# Patient Record
Sex: MALE | Race: WHITE | NOT HISPANIC OR LATINO | Employment: PART TIME | URBAN - METROPOLITAN AREA
[De-identification: names, ages, dates, MRNs, and addresses within clinical notes are randomized per-mention and may not be internally consistent; named-entity substitution may affect disease eponyms.]

---

## 2022-01-01 ENCOUNTER — HOSPITAL ENCOUNTER (INPATIENT)
Facility: CLINIC | Age: 37
LOS: 3 days | Discharge: HOME | End: 2022-01-05
Attending: EMERGENCY MEDICINE | Admitting: INTERNAL MEDICINE
Payer: COMMERCIAL

## 2022-01-01 ENCOUNTER — APPOINTMENT (OUTPATIENT)
Dept: GENERAL RADIOLOGY | Facility: CLINIC | Age: 37
End: 2022-01-01
Attending: EMERGENCY MEDICINE
Payer: COMMERCIAL

## 2022-01-01 DIAGNOSIS — R94.31 QT PROLONGATION: ICD-10-CM

## 2022-01-01 DIAGNOSIS — J96.01 ACUTE RESPIRATORY FAILURE WITH HYPOXIA (H): ICD-10-CM

## 2022-01-01 DIAGNOSIS — U07.1 PNEUMONIA DUE TO 2019 NOVEL CORONAVIRUS: ICD-10-CM

## 2022-01-01 DIAGNOSIS — J12.82 PNEUMONIA DUE TO 2019 NOVEL CORONAVIRUS: ICD-10-CM

## 2022-01-01 LAB
ALBUMIN SERPL-MCNC: 2 G/DL (ref 3.4–5)
ALP SERPL-CCNC: 124 U/L (ref 40–150)
ALT SERPL W P-5'-P-CCNC: 49 U/L (ref 0–70)
ANION GAP SERPL CALCULATED.3IONS-SCNC: 9 MMOL/L (ref 3–14)
AST SERPL W P-5'-P-CCNC: 63 U/L (ref 0–45)
BASOPHILS # BLD AUTO: 0.1 10E3/UL (ref 0–0.2)
BASOPHILS NFR BLD AUTO: 1 %
BILIRUB SERPL-MCNC: 1.1 MG/DL (ref 0.2–1.3)
BUN SERPL-MCNC: 7 MG/DL (ref 7–30)
CALCIUM SERPL-MCNC: 8.7 MG/DL (ref 8.5–10.1)
CHLORIDE BLD-SCNC: 101 MMOL/L (ref 94–109)
CO2 SERPL-SCNC: 25 MMOL/L (ref 20–32)
CREAT SERPL-MCNC: 0.66 MG/DL (ref 0.66–1.25)
D DIMER PPP FEU-MCNC: >20 UG/ML FEU (ref 0–0.5)
EOSINOPHIL # BLD AUTO: 0.1 10E3/UL (ref 0–0.7)
EOSINOPHIL NFR BLD AUTO: 1 %
ERYTHROCYTE [DISTWIDTH] IN BLOOD BY AUTOMATED COUNT: 12.3 % (ref 10–15)
GFR SERPL CREATININE-BSD FRML MDRD: >90 ML/MIN/1.73M2
GLUCOSE BLD-MCNC: 132 MG/DL (ref 70–99)
HCO3 BLDV-SCNC: 27 MMOL/L (ref 21–28)
HCT VFR BLD AUTO: 51.2 % (ref 40–53)
HGB BLD-MCNC: 17.6 G/DL (ref 13.3–17.7)
HOLD SPECIMEN: NORMAL
HOLD SPECIMEN: NORMAL
IMM GRANULOCYTES # BLD: 0.2 10E3/UL
IMM GRANULOCYTES NFR BLD: 2 %
LACTATE BLD-SCNC: 3.2 MMOL/L
LYMPHOCYTES # BLD AUTO: 1.7 10E3/UL (ref 0.8–5.3)
LYMPHOCYTES NFR BLD AUTO: 11 %
MAGNESIUM SERPL-MCNC: 2.2 MG/DL (ref 1.6–2.3)
MCH RBC QN AUTO: 31.6 PG (ref 26.5–33)
MCHC RBC AUTO-ENTMCNC: 34.4 G/DL (ref 31.5–36.5)
MCV RBC AUTO: 92 FL (ref 78–100)
MONOCYTES # BLD AUTO: 1.7 10E3/UL (ref 0–1.3)
MONOCYTES NFR BLD AUTO: 11 %
NEUTROPHILS # BLD AUTO: 12.1 10E3/UL (ref 1.6–8.3)
NEUTROPHILS NFR BLD AUTO: 74 %
NRBC # BLD AUTO: 0 10E3/UL
NRBC BLD AUTO-RTO: 0 /100
PCO2 BLDV: 46 MM HG (ref 40–50)
PH BLDV: 7.38 [PH] (ref 7.32–7.43)
PLATELET # BLD AUTO: 335 10E3/UL (ref 150–450)
PO2 BLDV: 23 MM HG (ref 25–47)
POTASSIUM BLD-SCNC: 3.5 MMOL/L (ref 3.4–5.3)
PROT SERPL-MCNC: 7.8 G/DL (ref 6.8–8.8)
RBC # BLD AUTO: 5.57 10E6/UL (ref 4.4–5.9)
SAO2 % BLDV: 37 % (ref 94–100)
SODIUM SERPL-SCNC: 135 MMOL/L (ref 133–144)
TROPONIN I SERPL HS-MCNC: 8 NG/L
WBC # BLD AUTO: 15.9 10E3/UL (ref 4–11)

## 2022-01-01 PROCEDURE — 71045 X-RAY EXAM CHEST 1 VIEW: CPT

## 2022-01-01 PROCEDURE — 250N000011 HC RX IP 250 OP 636: Performed by: EMERGENCY MEDICINE

## 2022-01-01 PROCEDURE — 999N000185 HC STATISTIC TRANSPORT TIME EA 15 MIN

## 2022-01-01 PROCEDURE — 96366 THER/PROPH/DIAG IV INF ADDON: CPT

## 2022-01-01 PROCEDURE — 93005 ELECTROCARDIOGRAM TRACING: CPT

## 2022-01-01 PROCEDURE — 85025 COMPLETE CBC W/AUTO DIFF WBC: CPT | Performed by: EMERGENCY MEDICINE

## 2022-01-01 PROCEDURE — 96368 THER/DIAG CONCURRENT INF: CPT

## 2022-01-01 PROCEDURE — 87040 BLOOD CULTURE FOR BACTERIA: CPT | Performed by: EMERGENCY MEDICINE

## 2022-01-01 PROCEDURE — 96367 TX/PROPH/DG ADDL SEQ IV INF: CPT

## 2022-01-01 PROCEDURE — 80053 COMPREHEN METABOLIC PANEL: CPT | Performed by: EMERGENCY MEDICINE

## 2022-01-01 PROCEDURE — 258N000003 HC RX IP 258 OP 636: Performed by: EMERGENCY MEDICINE

## 2022-01-01 PROCEDURE — 99292 CRITICAL CARE ADDL 30 MIN: CPT

## 2022-01-01 PROCEDURE — 36415 COLL VENOUS BLD VENIPUNCTURE: CPT | Performed by: EMERGENCY MEDICINE

## 2022-01-01 PROCEDURE — 85379 FIBRIN DEGRADATION QUANT: CPT | Performed by: EMERGENCY MEDICINE

## 2022-01-01 PROCEDURE — 82803 BLOOD GASES ANY COMBINATION: CPT

## 2022-01-01 PROCEDURE — 83735 ASSAY OF MAGNESIUM: CPT | Performed by: EMERGENCY MEDICINE

## 2022-01-01 PROCEDURE — 82040 ASSAY OF SERUM ALBUMIN: CPT | Performed by: EMERGENCY MEDICINE

## 2022-01-01 PROCEDURE — 94660 CPAP INITIATION&MGMT: CPT

## 2022-01-01 PROCEDURE — 84484 ASSAY OF TROPONIN QUANT: CPT | Performed by: EMERGENCY MEDICINE

## 2022-01-01 PROCEDURE — 96365 THER/PROPH/DIAG IV INF INIT: CPT

## 2022-01-01 PROCEDURE — 96375 TX/PRO/DX INJ NEW DRUG ADDON: CPT

## 2022-01-01 PROCEDURE — 96361 HYDRATE IV INFUSION ADD-ON: CPT

## 2022-01-01 PROCEDURE — 99291 CRITICAL CARE FIRST HOUR: CPT | Mod: 25

## 2022-01-01 PROCEDURE — 84145 PROCALCITONIN (PCT): CPT | Performed by: INTERNAL MEDICINE

## 2022-01-01 RX ORDER — SODIUM CHLORIDE 9 MG/ML
INJECTION, SOLUTION INTRAVENOUS CONTINUOUS
Status: DISCONTINUED | OUTPATIENT
Start: 2022-01-02 | End: 2022-01-02

## 2022-01-01 RX ORDER — MAGNESIUM SULFATE HEPTAHYDRATE 40 MG/ML
2 INJECTION, SOLUTION INTRAVENOUS ONCE
Status: COMPLETED | OUTPATIENT
Start: 2022-01-01 | End: 2022-01-02

## 2022-01-01 RX ORDER — CEFTRIAXONE 2 G/1
2 INJECTION, POWDER, FOR SOLUTION INTRAMUSCULAR; INTRAVENOUS ONCE
Status: COMPLETED | OUTPATIENT
Start: 2022-01-01 | End: 2022-01-02

## 2022-01-01 RX ORDER — LORAZEPAM 2 MG/ML
0.5 INJECTION INTRAMUSCULAR ONCE
Status: COMPLETED | OUTPATIENT
Start: 2022-01-01 | End: 2022-01-01

## 2022-01-01 RX ORDER — DOXYCYCLINE 100 MG/10ML
100 INJECTION, POWDER, LYOPHILIZED, FOR SOLUTION INTRAVENOUS EVERY 12 HOURS
Status: DISCONTINUED | OUTPATIENT
Start: 2022-01-01 | End: 2022-01-05

## 2022-01-01 RX ORDER — DEXAMETHASONE SODIUM PHOSPHATE 10 MG/ML
6 INJECTION, SOLUTION INTRAMUSCULAR; INTRAVENOUS ONCE
Status: COMPLETED | OUTPATIENT
Start: 2022-01-01 | End: 2022-01-01

## 2022-01-01 RX ADMIN — SODIUM CHLORIDE 1000 ML: 9 INJECTION, SOLUTION INTRAVENOUS at 23:10

## 2022-01-01 RX ADMIN — CEFTRIAXONE 2 G: 2 INJECTION, POWDER, FOR SOLUTION INTRAMUSCULAR; INTRAVENOUS at 23:39

## 2022-01-01 RX ADMIN — LORAZEPAM 0.5 MG: 2 INJECTION INTRAMUSCULAR; INTRAVENOUS at 23:25

## 2022-01-01 RX ADMIN — DEXAMETHASONE SODIUM PHOSPHATE 6 MG: 10 INJECTION INTRAMUSCULAR; INTRAVENOUS at 23:10

## 2022-01-02 ENCOUNTER — APPOINTMENT (OUTPATIENT)
Dept: CT IMAGING | Facility: CLINIC | Age: 37
End: 2022-01-02
Attending: INTERNAL MEDICINE
Payer: COMMERCIAL

## 2022-01-02 ENCOUNTER — APPOINTMENT (OUTPATIENT)
Dept: ULTRASOUND IMAGING | Facility: CLINIC | Age: 37
End: 2022-01-02
Attending: EMERGENCY MEDICINE
Payer: COMMERCIAL

## 2022-01-02 ENCOUNTER — APPOINTMENT (OUTPATIENT)
Dept: CARDIOLOGY | Facility: CLINIC | Age: 37
End: 2022-01-02
Attending: INTERNAL MEDICINE
Payer: COMMERCIAL

## 2022-01-02 PROBLEM — U07.1 PNEUMONIA DUE TO 2019 NOVEL CORONAVIRUS: Status: ACTIVE | Noted: 2022-01-02

## 2022-01-02 PROBLEM — J12.82 PNEUMONIA DUE TO 2019 NOVEL CORONAVIRUS: Status: ACTIVE | Noted: 2022-01-02

## 2022-01-02 LAB
ANION GAP SERPL CALCULATED.3IONS-SCNC: 9 MMOL/L (ref 3–14)
BUN SERPL-MCNC: 7 MG/DL (ref 7–30)
CALCIUM SERPL-MCNC: 8.3 MG/DL (ref 8.5–10.1)
CHLORIDE BLD-SCNC: 107 MMOL/L (ref 94–109)
CO2 SERPL-SCNC: 25 MMOL/L (ref 20–32)
CREAT SERPL-MCNC: 0.63 MG/DL (ref 0.66–1.25)
CRP SERPL-MCNC: 290 MG/L (ref 0–8)
D DIMER PPP FEU-MCNC: >20 UG/ML FEU (ref 0–0.5)
GFR SERPL CREATININE-BSD FRML MDRD: >90 ML/MIN/1.73M2
GLUCOSE BLD-MCNC: 110 MG/DL (ref 70–99)
HOLD SPECIMEN: NORMAL
HOLD SPECIMEN: NORMAL
LACTATE SERPL-SCNC: 1 MMOL/L (ref 0.7–2)
LVEF ECHO: NORMAL
POTASSIUM BLD-SCNC: 3.8 MMOL/L (ref 3.4–5.3)
PROCALCITONIN SERPL-MCNC: 19.83 NG/ML
SODIUM SERPL-SCNC: 141 MMOL/L (ref 133–144)

## 2022-01-02 PROCEDURE — 999N000158 HC STATISTIC RCP TIME ED VENT EA 10 MIN

## 2022-01-02 PROCEDURE — 250N000009 HC RX 250: Performed by: INTERNAL MEDICINE

## 2022-01-02 PROCEDURE — 250N000011 HC RX IP 250 OP 636: Performed by: EMERGENCY MEDICINE

## 2022-01-02 PROCEDURE — 999N000157 HC STATISTIC RCP TIME EA 10 MIN

## 2022-01-02 PROCEDURE — 86140 C-REACTIVE PROTEIN: CPT | Performed by: INTERNAL MEDICINE

## 2022-01-02 PROCEDURE — 250N000013 HC RX MED GY IP 250 OP 250 PS 637: Performed by: INTERNAL MEDICINE

## 2022-01-02 PROCEDURE — 250N000011 HC RX IP 250 OP 636: Performed by: INTERNAL MEDICINE

## 2022-01-02 PROCEDURE — 36415 COLL VENOUS BLD VENIPUNCTURE: CPT | Performed by: EMERGENCY MEDICINE

## 2022-01-02 PROCEDURE — 93970 EXTREMITY STUDY: CPT

## 2022-01-02 PROCEDURE — 83605 ASSAY OF LACTIC ACID: CPT | Performed by: EMERGENCY MEDICINE

## 2022-01-02 PROCEDURE — 99291 CRITICAL CARE FIRST HOUR: CPT | Performed by: INTERNAL MEDICINE

## 2022-01-02 PROCEDURE — 999N000105 HC STATISTIC NO DOCUMENTATION TO SUPPORT CHARGE

## 2022-01-02 PROCEDURE — 999N000185 HC STATISTIC TRANSPORT TIME EA 15 MIN

## 2022-01-02 PROCEDURE — 258N000003 HC RX IP 258 OP 636: Performed by: INTERNAL MEDICINE

## 2022-01-02 PROCEDURE — 93306 TTE W/DOPPLER COMPLETE: CPT | Mod: 26 | Performed by: INTERNAL MEDICINE

## 2022-01-02 PROCEDURE — 71275 CT ANGIOGRAPHY CHEST: CPT

## 2022-01-02 PROCEDURE — 85379 FIBRIN DEGRADATION QUANT: CPT | Performed by: INTERNAL MEDICINE

## 2022-01-02 PROCEDURE — 250N000009 HC RX 250: Performed by: EMERGENCY MEDICINE

## 2022-01-02 PROCEDURE — XW033E5 INTRODUCTION OF REMDESIVIR ANTI-INFECTIVE INTO PERIPHERAL VEIN, PERCUTANEOUS APPROACH, NEW TECHNOLOGY GROUP 5: ICD-10-PCS | Performed by: INTERNAL MEDICINE

## 2022-01-02 PROCEDURE — 200N000001 HC R&B ICU

## 2022-01-02 PROCEDURE — 94660 CPAP INITIATION&MGMT: CPT

## 2022-01-02 PROCEDURE — 82310 ASSAY OF CALCIUM: CPT | Performed by: INTERNAL MEDICINE

## 2022-01-02 PROCEDURE — 93306 TTE W/DOPPLER COMPLETE: CPT

## 2022-01-02 PROCEDURE — 36415 COLL VENOUS BLD VENIPUNCTURE: CPT | Performed by: INTERNAL MEDICINE

## 2022-01-02 RX ORDER — BISACODYL 10 MG
10 SUPPOSITORY, RECTAL RECTAL DAILY PRN
Status: DISCONTINUED | OUTPATIENT
Start: 2022-01-02 | End: 2022-01-05 | Stop reason: HOSPADM

## 2022-01-02 RX ORDER — ALLOPURINOL 100 MG/1
100 TABLET ORAL DAILY
COMMUNITY
Start: 2021-11-23

## 2022-01-02 RX ORDER — SODIUM CHLORIDE AND POTASSIUM CHLORIDE 150; 900 MG/100ML; MG/100ML
INJECTION, SOLUTION INTRAVENOUS CONTINUOUS
Status: DISCONTINUED | OUTPATIENT
Start: 2022-01-02 | End: 2022-01-03

## 2022-01-02 RX ORDER — DEXTROAMPHETAMINE SACCHARATE, AMPHETAMINE ASPARTATE MONOHYDRATE, DEXTROAMPHETAMINE SULFATE AND AMPHETAMINE SULFATE 7.5; 7.5; 7.5; 7.5 MG/1; MG/1; MG/1; MG/1
30 CAPSULE, EXTENDED RELEASE ORAL DAILY
COMMUNITY
Start: 2021-06-30

## 2022-01-02 RX ORDER — DEXTROSE MONOHYDRATE 25 G/50ML
25-50 INJECTION, SOLUTION INTRAVENOUS
Status: DISCONTINUED | OUTPATIENT
Start: 2022-01-02 | End: 2022-01-05 | Stop reason: HOSPADM

## 2022-01-02 RX ORDER — ONDANSETRON 4 MG/1
4 TABLET, ORALLY DISINTEGRATING ORAL EVERY 6 HOURS PRN
Status: DISCONTINUED | OUTPATIENT
Start: 2022-01-02 | End: 2022-01-05 | Stop reason: HOSPADM

## 2022-01-02 RX ORDER — HYDROMORPHONE HYDROCHLORIDE 1 MG/ML
.3-.5 INJECTION, SOLUTION INTRAMUSCULAR; INTRAVENOUS; SUBCUTANEOUS
Status: DISCONTINUED | OUTPATIENT
Start: 2022-01-02 | End: 2022-01-05 | Stop reason: HOSPADM

## 2022-01-02 RX ORDER — SODIUM CHLORIDE 9 MG/ML
INJECTION, SOLUTION INTRAVENOUS CONTINUOUS
Status: DISCONTINUED | OUTPATIENT
Start: 2022-01-02 | End: 2022-01-02 | Stop reason: ALTCHOICE

## 2022-01-02 RX ORDER — CEFTRIAXONE 2 G/1
2 INJECTION, POWDER, FOR SOLUTION INTRAMUSCULAR; INTRAVENOUS EVERY 24 HOURS
Status: DISCONTINUED | OUTPATIENT
Start: 2022-01-02 | End: 2022-01-05 | Stop reason: HOSPADM

## 2022-01-02 RX ORDER — ACETAMINOPHEN 650 MG/1
650 SUPPOSITORY RECTAL EVERY 4 HOURS PRN
Status: DISCONTINUED | OUTPATIENT
Start: 2022-01-02 | End: 2022-01-05 | Stop reason: HOSPADM

## 2022-01-02 RX ORDER — DEXAMETHASONE SODIUM PHOSPHATE 10 MG/ML
6 INJECTION, SOLUTION INTRAMUSCULAR; INTRAVENOUS EVERY 24 HOURS
Status: DISCONTINUED | OUTPATIENT
Start: 2022-01-02 | End: 2022-01-04

## 2022-01-02 RX ORDER — IOPAMIDOL 755 MG/ML
500 INJECTION, SOLUTION INTRAVASCULAR ONCE
Status: COMPLETED | OUTPATIENT
Start: 2022-01-02 | End: 2022-01-02

## 2022-01-02 RX ORDER — NICOTINE 21 MG/24HR
1 PATCH, TRANSDERMAL 24 HOURS TRANSDERMAL EVERY 24 HOURS
Status: ON HOLD | COMMUNITY
End: 2022-01-05

## 2022-01-02 RX ORDER — ONDANSETRON 2 MG/ML
4 INJECTION INTRAMUSCULAR; INTRAVENOUS EVERY 6 HOURS PRN
Status: DISCONTINUED | OUTPATIENT
Start: 2022-01-02 | End: 2022-01-05 | Stop reason: HOSPADM

## 2022-01-02 RX ORDER — NICOTINE 21 MG/24HR
1 PATCH, TRANSDERMAL 24 HOURS TRANSDERMAL DAILY
Status: DISCONTINUED | OUTPATIENT
Start: 2022-01-02 | End: 2022-01-05 | Stop reason: HOSPADM

## 2022-01-02 RX ORDER — ACETAMINOPHEN 325 MG/1
650 TABLET ORAL EVERY 4 HOURS PRN
Status: DISCONTINUED | OUTPATIENT
Start: 2022-01-02 | End: 2022-01-05 | Stop reason: HOSPADM

## 2022-01-02 RX ORDER — NICOTINE POLACRILEX 4 MG
15-30 LOZENGE BUCCAL
Status: DISCONTINUED | OUTPATIENT
Start: 2022-01-02 | End: 2022-01-05 | Stop reason: HOSPADM

## 2022-01-02 RX ADMIN — REMDESIVIR 200 MG: 100 INJECTION, POWDER, LYOPHILIZED, FOR SOLUTION INTRAVENOUS at 01:59

## 2022-01-02 RX ADMIN — ENOXAPARIN SODIUM 100 MG: 100 INJECTION SUBCUTANEOUS at 12:16

## 2022-01-02 RX ADMIN — POTASSIUM CHLORIDE AND SODIUM CHLORIDE: 900; 150 INJECTION, SOLUTION INTRAVENOUS at 12:15

## 2022-01-02 RX ADMIN — DOXYCYCLINE 100 MG: 100 INJECTION, POWDER, LYOPHILIZED, FOR SOLUTION INTRAVENOUS at 22:20

## 2022-01-02 RX ADMIN — NICOTINE 1 PATCH: 21 PATCH, EXTENDED RELEASE TRANSDERMAL at 15:32

## 2022-01-02 RX ADMIN — SODIUM CHLORIDE 90 ML: 9 INJECTION, SOLUTION INTRAVENOUS at 16:57

## 2022-01-02 RX ADMIN — ENOXAPARIN SODIUM 100 MG: 100 INJECTION SUBCUTANEOUS at 00:58

## 2022-01-02 RX ADMIN — DOXYCYCLINE 100 MG: 100 INJECTION, POWDER, LYOPHILIZED, FOR SOLUTION INTRAVENOUS at 11:12

## 2022-01-02 RX ADMIN — DEXAMETHASONE SODIUM PHOSPHATE 6 MG: 10 INJECTION INTRAMUSCULAR; INTRAVENOUS at 12:14

## 2022-01-02 RX ADMIN — SODIUM CHLORIDE 50 ML: 900 INJECTION INTRAVENOUS at 02:38

## 2022-01-02 RX ADMIN — SODIUM CHLORIDE: 9 INJECTION, SOLUTION INTRAVENOUS at 03:31

## 2022-01-02 RX ADMIN — CEFTRIAXONE 2 G: 2 INJECTION, POWDER, FOR SOLUTION INTRAMUSCULAR; INTRAVENOUS at 21:49

## 2022-01-02 RX ADMIN — IOPAMIDOL 71 ML: 755 INJECTION, SOLUTION INTRAVENOUS at 16:57

## 2022-01-02 RX ADMIN — SODIUM CHLORIDE 50 ML: 9 INJECTION, SOLUTION INTRAVENOUS at 17:23

## 2022-01-02 RX ADMIN — MAGNESIUM SULFATE HEPTAHYDRATE 2 G: 40 INJECTION, SOLUTION INTRAVENOUS at 00:11

## 2022-01-02 RX ADMIN — REMDESIVIR 100 MG: 100 INJECTION, POWDER, LYOPHILIZED, FOR SOLUTION INTRAVENOUS at 17:23

## 2022-01-02 RX ADMIN — DOXYCYCLINE 100 MG: 100 INJECTION, POWDER, LYOPHILIZED, FOR SOLUTION INTRAVENOUS at 00:08

## 2022-01-02 RX ADMIN — POTASSIUM CHLORIDE AND SODIUM CHLORIDE: 900; 150 INJECTION, SOLUTION INTRAVENOUS at 21:50

## 2022-01-02 ASSESSMENT — ACTIVITIES OF DAILY LIVING (ADL)
ADLS_ACUITY_SCORE: 12
ADLS_ACUITY_SCORE: 3

## 2022-01-02 NOTE — H&P
[facility]  Hospitalist Admission Note  Name: Greg Sage    MRN: [id number]  YOB: 1985    Age: 36 year old  Date of admission: 1/1/2022  Primary care provider: Candy Julian    Chief Complaint: COVID-19 pneumonia, possible bacterial superinfection    Greg Sage is a 36 year old male who is not vaccinated against Covid but denies any known medical problems who presents with severe shortness of breath, tachycardia and left-sided chest pain in the setting of approximately 9 days of symptoms of COVID-19.  He reports that he tested +9 days ago and continued to have cough, body aches and shortness of breath.  Today however, he developed fairly severe pain right below his left collarbone and also some left upper thigh discomfort.      In the ER triage his oxygen saturation was approximately 52%.  He was tachycardic to the 140s with a rhythm appearing to be sinus.  Blood pressure was stable to mildly hypertensive and he had increased rate of breathing up to 40/min.  Lab work-up showed relatively normal CMP aside from albumin of just 2.0 and AST mildly elevated at 63.  Lactic acid was elevated at 3.2, high-sensitivity troponin was negative at 8.  He had neutrophilic leukocytosis with white blood count of 15.9.  Venous blood gas showed pH of 7.38 with bicarbonate of 27, PO2 of 23 and PCO2 of 46.  COVID-19 swab was positive D-dimer was profoundly elevated at greater than 20.  X-ray showed patchy bilateral opacities compatible with COVID-19 pneumonia.    Blood cultures were obtained and he was started on empiric IV ceftriaxone, doxycycline and given a dose of Decadron.  Attempt was made at a CT with PE protocol but due to his shortness of breath he could not lie in the CT scanner so that attempt was aborted for now.  Lower extremity ultrasounds were ordered and in the meantime due to very high clinical suspicion for DVT/PE with profoundly elevated D-dimer he was given a dose of  therapeutic Lovenox.    He is currently saturating approximately 94% on 100% FiO2 BiPAP with pressures of 14/7.  He will be admitted under ICU status for ongoing care.      Assessment and Plan:   1. Severe hypoxemic respiratory failure due to COVID-19 pneumonia, rule out PE, rule out bacterial superinfection: As above.  --Admit under inpatient status in the ICU  --Continue Decadron 6 mg daily  --Add remdesivir, discussed this with him and he is agreeable  --Continue ceftriaxone and doxycycline.  Add on procalcitonin and await blood cultures  --He was given a 1 L fluid bolus in the ER followed by 125 cc/h normal saline for possible bacterial sepsis.  --Repeat lactic acid  --Obtain lower extremity ultrasound.  Plan to continue therapeutic range anticoagulation for now  --Obtain CT PE protocol when and if we are able  --Consider baricitinib though I think it is possible he has a bacterial infection which would be a contraindication to that or tocilizumab  --Trend inflammatory markers, D-dimer, renal function, CBC etc.  --It is possible he will require intubation tonight.  He is full code and I discussed this with him.    2.   Profoundly elevated D-dimer: Could not tolerate CT PE protocol due to respiratory distress.  Given 100 mg subcu Lovenox to start therapeutic anticoagulation in the meantime.  Obtain lower extremity ultrasound and CT PE protocol as able.  Certainly he is quite hypercoagulable from COVID-19 and has left upper chest pain which could be from a PE or pleuristy from pneumonia.  --Continue Lovenox 100 mg twice daily for now, revisit once further imaging data are available  --Check echocardiogram to evaluate for evidence of right heart strain or other cause of sinus tachycardia  --obtain CT PE protocol as able.    3.   Sinus tachycardia, elevated lactic acid and leukocytosis: Qualifies as septic shock.  Raises the question of a bacterial superinfection as well, specifically bacterial pneumonia.  Given 1L  bolus, now on 125 mL/hr NS.  --Being somewhat judicious with fluids in the setting of COVID-19 and the general preference to be cautious to avoid fluid overload with COVID-19 infection  --Continue normal saline at 75 cc/h for the next 12 hours, revisit fluids in the morning  --Recheck lactic acid  --Continue ceftriaxone and doxycycline  --Await blood cultures  --Echo  --Check CT PE protocol to evaluate for pulmonary embolism.  Troponin is negative which is somewhat reassuring against submassive PE.          DVT Prophylaxis: Therapeutic dose Lovenox pending ultrasound and potentially CT PE protocol.  Will need to revisit anticoagulation dosing once the studies are completed.  Code Status: Full Code, I did discuss this with him.  Discharge Dispo: Admit under ICU status      History of Present Illness:  Greg Sage is a 36 year old male who is not vaccinated against Covid but denies any known medical problems who presents with severe shortness of breath, tachycardia and left-sided chest pain in the setting of approximately 9 days of symptoms of COVID-19.  He reports that he tested +9 days ago and continued to have cough, body aches and shortness of breath.  Today however, he developed fairly severe pain right below his left collarbone and also some left upper thigh discomfort.      In the ER triage his oxygen saturation was approximately 52% requiring urgent stabilization and placement of BiPAP.    History is somewhat limited by the acuity of his situation and the fact that he is currently on 100% FiO2 BiPAP.  He is able to offer brief answers to questions.  He confirms he is not vaccinated and does confirm his full code.  Denies any medical history really whatsoever     Past Medical History:  Denies any chronic medical problems    Past Surgical History:  Denies any surgeries    Social History:  Difficult to obtain due to the acuity of the situation.      Family History:  Reports both of his parents had cancer at  one point.  Father is .    Allergies:  Allergies   Allergen Reactions     Bactrim [Sulfamethoxazole W/Trimethoprim]      Medications:  None.      Review of Systems:  A Comprehensive greater than 10 system review of systems was carried out.  Pertinent positives and negatives are noted above.  Otherwise negative for contributory information.     Physical Exam:  Blood pressure 130/80, pulse (!) 130, temperature 99.1  F (37.3  C), temperature source Temporal, resp. rate (!) 40, weight 99.8 kg (220 lb), SpO2 92 %.  Wt Readings from Last 1 Encounters:   22 99.8 kg (220 lb)     Exam:  General: Ill-appearing well-built male lying in bed with a BiPAP mask in place  HEENT: BiPAP mask in place, external ocular movements intact.  Face appears symmetric.  Cardiac: Tachycardic, regular  Pulmonary: Increased rate of breathing, slightly increased work of breathing  Abdomen: soft, non-tender, non-distended.  Bowel Sounds Present.  No guarding.  Extremities: no deformities.  Warm, well perfused.  Skin: no rashes or lesions noted.  Warm and Dry.  Neuro: No focal deficits noted.  Speech clear.  Coordination and strength grossly normal.  Psych: Appropriate affect.    Data:  EKG: Sinus tachycardia with nonspecific ST changes  Imaging:  Results for orders placed or performed during the hospital encounter of 22   XR Chest Port 1 View    Narrative    EXAM: XR CHEST PORT 1 VIEW  LOCATION: [facility]  DATE/TIME: 2022 11:07 PM    INDICATION: shortness of breath  COMPARISON: None.      Impression    IMPRESSION: Extensive patchy airspace infiltrates both lower lobes most consistent with pneumonia and very consistent with COVID. Heart size normal.     Labs:  Recent Labs   Lab 22  2300   WBC 15.9*   HGB 17.6   HCT 51.2   MCV 92             Lab Results   Component Value Date     2022    Lab Results   Component Value Date    CHLORIDE 101 2022    Lab Results   Component  Value Date    BUN 7 01/01/2022      Lab Results   Component Value Date    POTASSIUM 3.5 01/01/2022    Lab Results   Component Value Date    CO2 25 01/01/2022    Lab Results   Component Value Date    CR 0.66 01/01/2022        Recent Labs   Lab 01/01/22  2300   DD >20.00*     No results for input(s): SED, CRP in the last 168 hours.      Patrick Sheikh MD  Hospitalist  [facility]    Evaluation and management time exclusive of procedures was 50 minutes critical care time including: urgent examination and evaluation of the patient, discussion of the patient's condition with other physicians and members of the care team, reviewing data and chart related to the patient, discussion of patient's condition with the family and time utilizing the EMR for documentation of this patient's care.

## 2022-01-02 NOTE — ED TRIAGE NOTES
Here for left chest pain and sob started today associated with left back pain, coughing, and headache. Stated diagnose with Covid last Thursday. ABCs intact.

## 2022-01-02 NOTE — PHARMACY-ADMISSION MEDICATION HISTORY
Admission medication history interview status for this patient is complete. See [facility] admission navigator for allergy information, prior to admission medications and immunization status.     Medication history interview done, indicate source(s): Patient  Medication history resources (including written lists, pill bottles, clinic record):SureScripts and Care Everywhere  Pharmacy: [facility] on [address]    Changes made to PTA medication list:  Added: nicotine  Changed: None  Reported as Not Taking: None  Removed: None    Actions taken by pharmacist (provider contacted, etc): Spoke with pt to verify med list.     Additional medication history information:None    Medication reconciliation/reorder completed by provider prior to medication history?  N   (Y/N)     For patients on insulin therapy: N     Prior to Admission medications    Medication Sig Last Dose Taking? Auth Provider   allopurinol (ZYLOPRIM) 100 MG tablet Take 100 mg by mouth daily 1/1/2022 at Unknown time Yes Reported, Patient   amphetamine-dextroamphetamine (ADDERALL XR) 30 MG 24 hr capsule Take 30 mg by mouth 1/1/2022 at Unknown time Yes Reported, Patient   nicotine (NICODERM CQ) 14 MG/24HR 24 hr patch Place 1 patch onto the skin every 24 hours Past Week at Unknown time Yes Unknown, Entered By History

## 2022-01-02 NOTE — PROGRESS NOTES
A BiPAP of  12/6 @ 40% was applied to the pt via the mask for an increase in WOB and/or SOB.  .Pt is tolerating it well. Will continue to monitor and assess the pt's current respiratory status and needs.

## 2022-01-02 NOTE — PROGRESS NOTES
No charge note,  patient admitted earlier this morning by Dr. Sheikh please refer to H&P agree with his assessment and plan    36 year old male who is not vaccinated against Covid but denies any known medical problems who presents with severe shortness of breath, tachycardia and left-sided chest pain in the setting of approximately 9 days of symptoms of COVID-19.  He reports that he tested +9 days ago and continued to have cough, body aches and shortness of breath.    Severe hypoxemic respiratory failure due to COVID-19 pneumonia, rule out PE, rule out bacterial superinfection: baricitinib was not used because concern for superimposed pneumonia procalcitonin almost 20  Currently on BiPAP for acute hypoxic respiratory failure  Continue remdesivir and Decadron  Continue antibiotic coverage for superimposed pneumonia highly suspected    Significantly elevated D-dimer with clinical concern for pulmonary embolism so far unable to tolerate CT scan will try again later today continue full dose Lovenox for now.  Echocardiogram is pending to evaluate for RV strain    Full code  Expected stay multiple days more than 5    I tried to update his wife no answer in the voice message did not

## 2022-01-02 NOTE — ED PROVIDER NOTES
History   Chief Complaint:  Shortness of Breath       HPI   Greg Sage is a 36 year old male who presents with shortness of breath. The patient reports that he woke up today with chest pain, shortness of breath, and coughing. He tested positive for COVID 9 days ago. The patient is not vaccinated against COVID. His [facility] site for medical care is [facility].    Review of Systems   All other systems reviewed and are negative.    Allergies:  Bactrim [Sulfamethoxazole W/Trimethoprim]    Medications:  The patient is not currently taking any prescribed medications.    Past Medical History:     The patient denies any significant past medical history.    Social History:  Patient presents alone    Physical Exam     Patient Vitals for the past 24 hrs:   BP Temp Temp src Pulse Resp SpO2 Weight   01/02/22 0000 130/80 -- -- (!) 130 (!) 40 92 % --   01/01/22 2359 -- -- -- -- -- -- 99.8 kg (220 lb)   01/01/22 2300 (!) 130/92 -- -- (!) 145 -- 90 % 99.8 kg (220 lb)   01/01/22 2248 (!) 150/98 99.1  F (37.3  C) Temporal (!) 167 18 (!) 52 % --       Physical Exam  General: Resting on the bed.  Appears very short of breath and ill  Head: No obvious trauma to head.  Ears, Nose, Throat:  External ears normal.  Nose normal.    Eyes:  Conjunctivae clear.  Pupils are equal, round, and reactive.   Neck: Normal range of motion.  Neck supple.   CV: Regular rate and rhythm.  No murmurs.      Respiratory: Effort increased, moving minimal air. No focal wheezing or crackles.  Retracting.    Gastrointestinal: Soft.  No distension. There is no tenderness.    Musculoskeletal: Non tender non edematous calves  Neuro: Alert. Moving all extremities appropriately.  Normal speech.    Skin: Skin is warm and dry.  No rash noted.   Emergency Department Course   ECG  ECG obtained at 2305, ECG read at 2312  Sinus tachycardia with occasional premature ventricular complexes  ST & T wave abnormality- rate related changes, consider inferior  ischemia  Prolonged QTc  Abnormal ECG   Rate 141 bpm. CA interval 112 ms. QRS duration 84 ms. QT/QTc 358/548 ms. P-R-T axes 47 51 17.     Imaging:  US Lower Extremity Venous Duplex Bilateral   Final Result   IMPRESSION:   1.  No deep venous thrombosis in the bilateral lower extremities.      XR Chest Port 1 View   Final Result   IMPRESSION: Extensive patchy airspace infiltrates both lower lobes most consistent with pneumonia and very consistent with COVID. Heart size normal.      CT Chest Pulmonary Embolism w Contrast    (Results Pending)     Report per radiology    Laboratory:  Labs Ordered and Resulted from Time of ED Arrival to Time of ED Departure   COMPREHENSIVE METABOLIC PANEL - Abnormal       Result Value    Sodium 135      Potassium 3.5      Chloride 101      Carbon Dioxide (CO2) 25      Anion Gap 9      Urea Nitrogen 7      Creatinine 0.66      Calcium 8.7      Glucose 132 (*)     Alkaline Phosphatase 124      AST 63 (*)     ALT 49      Protein Total 7.8      Albumin 2.0 (*)     Bilirubin Total 1.1      GFR Estimate >90     D DIMER QUANTITATIVE - Abnormal    D-Dimer Quantitative >20.00 (*)    CBC WITH PLATELETS AND DIFFERENTIAL - Abnormal    WBC Count 15.9 (*)     RBC Count 5.57      Hemoglobin 17.6      Hematocrit 51.2      MCV 92      MCH 31.6      MCHC 34.4      RDW 12.3      Platelet Count 335      % Neutrophils 74      % Lymphocytes 11      % Monocytes 11      % Eosinophils 1      % Basophils 1      % Immature Granulocytes 2      NRBCs per 100 WBC 0      Absolute Neutrophils 12.1 (*)     Absolute Lymphocytes 1.7      Absolute Monocytes 1.7 (*)     Absolute Eosinophils 0.1      Absolute Basophils 0.1      Absolute Immature Granulocytes 0.2      Absolute NRBCs 0.0     ISTAT GASES LACTATE VENOUS POCT - Abnormal    Lactic Acid POCT 3.2 (*)     Bicarbonate Venous POCT 27      O2 Sat, Venous POCT 37 (*)     pCO2V Venous POCT 46      pH Venous POCT 7.38      pO2 Venous POCT 23 (*)    TROPONIN I - Normal     Troponin I High Sensitivity 8     MAGNESIUM - Normal    Magnesium 2.2     LACTIC ACID WHOLE BLOOD   BLOOD CULTURE   BLOOD CULTURE        Emergency Department Course:       Reviewed:  I reviewed nursing notes, vitals and past medical history    Assessments:   I obtained history and examined the patient as noted above.    I rechecked the patient and explained findings.     Consults:  13 I spoke with Dr. Sheikh, hospitalist, regarding the patient    Interventions:  2310 NS 1 L IV  2310 Decadron 6 mg IV  2325 Ativan 0.5 mg IV  2339 Rocephin 2 g IV  0008 Doxycycline 100 mg IV  0011 Magnesium sulfate 2 g IV    Disposition:  The patient was admitted to the hospital under the care of Dr. Sheikh.     Impression & Plan       Medical Decision Makin-year-old male presents with shortness of breath.  Patient reports COVID-19 diagnosis 9 days previously.  Patient is very hypoxic with pulse ox in the 50s in triage.  He is significantly tachycardic as well.  Broad differential was pursued including not limited to pneumonia, pneumothorax, effusion, ACS or arrhythmia, PE, electrolyte, metabolic, renal dysfunction, dehydration, etc.  CBC with increased white count 15.9 but no anemia.  Lactate elevated 3.2.  Concern for severe sepsis therefore ceftriaxone and doxycycline were administered.  Patient has recent COVID-19 infection, this is the likely etiology of patient's pneumonia but plan to treat regardless.  Blood cultures pending.  BMP without acute electrolyte, metabolic or renal dysfunction.  D-dimer is greater than 20 although patient is unable to tolerate laying flat.  Bilateral lower extremity ultrasound were done and negative for DVT.  Lovenox given for empiric PE coverage.  EKG shows sinus tachycardia, rate related changes.  Troponin is negative.  QTc does appear somewhat prolonged therefore avoiding further prolonging agents.  Magnesium level normal.  Given prolonged QTC I did give 2 g of mag  regardless.  Remainder of electrolytes were normal.  Chest x-ray looks concerning for COVID-19 pneumonia.  No pneumothorax or effusion.  Given patient's significant work of breathing, hypoxia he was immediately transitioned to BiPAP.  BiPAP did improve patient's work of breathing.  He required a small amount of Ativan to relax enough to tolerate the mask.  We gave albuterol as well as patient was not moving large amount of air.  He was given dexamethasone as well for COVID-19 pneumonia.  At this point patient remains critically ill requiring ICU admission.  Discussed with the hospitalist who graciously accepted.  He continues to tolerate BiPAP well and we are continuing to slowly down titrate the FiO2 requirements.  Regardless patient remains quite ill and will require admission.    Critical Care Time: was 45 minutes for this patient excluding procedures    Diagnosis:    ICD-10-CM    1. Pneumonia due to 2019 novel coronavirus  U07.1     J12.82        Scribe Disclosure:  I, Yasmin Teran, am serving as a scribe at 10:57 PM on 1/1/2022 to document services personally performed by Ronit Orozco MD based on my observations and the provider's statements to me.            Ronit Orozco MD  01/02/22 0208

## 2022-01-02 NOTE — ED NOTES
[facility]  ED Nurse Handoff Report    Greg Sage is a 36 year old male   ED Chief complaint: Shortness of Breath  . ED Diagnosis:   Final diagnoses:   Pneumonia due to 2019 novel coronavirus   QT prolongation   Acute respiratory failure with hypoxia (H)     Allergies:   Allergies   Allergen Reactions     Bactrim [Sulfamethoxazole W/Trimethoprim]        Code Status: Full Code  Activity level - Baseline/Home:  Independent. Activity Level - Current:   Assist X 1. Lift room needed: No. Bariatric: No   Needed: No   Isolation: Yes. Infection: Not Applicable  COVID r/o and special precautions.     Vital Signs:   Vitals:    01/02/22 0730 01/02/22 0800 01/02/22 0824 01/02/22 0830   BP: (!) 133/93 127/87  122/86   Pulse: 109 97 105 100   Resp:   26    Temp:   99.3  F (37.4  C)    TempSrc:   Axillary    SpO2: 98% 97% 98% 98%   Weight:           Cardiac Rhythm:  ,      Pain level:    Patient confused: No. Patient Falls Risk: Yes.   Elimination Status: Has voided   Patient Report - Initial Complaint: SOB. Focused Assessment: Greg Sage is a 36 year old male who presents with shortness of breath. The patient reports that he woke up today with chest pain, shortness of breath, and coughing. He tested positive for COVID 9 days ago. The patient is not vaccinated against COVID. His usual site for medical care is [facility].   Tests Performed: see list; CT not performed as pt is unable to tolerate lying flat. Abnormal Results:   US Lower Extremity Venous Duplex Bilateral   Final Result   IMPRESSION:   1.  No deep venous thrombosis in the bilateral lower extremities.      XR Chest Port 1 View   Final Result   IMPRESSION: Extensive patchy airspace infiltrates both lower lobes most consistent with pneumonia and very consistent with COVID. Heart size normal.      Echocardiogram Complete    (Results Pending)     Labs Ordered and Resulted from Time of ED Arrival to Time of ED Departure    COMPREHENSIVE METABOLIC PANEL - Abnormal       Result Value    Sodium 135      Potassium 3.5      Chloride 101      Carbon Dioxide (CO2) 25      Anion Gap 9      Urea Nitrogen 7      Creatinine 0.66      Calcium 8.7      Glucose 132 (*)     Alkaline Phosphatase 124      AST 63 (*)     ALT 49      Protein Total 7.8      Albumin 2.0 (*)     Bilirubin Total 1.1      GFR Estimate >90     D DIMER QUANTITATIVE - Abnormal    D-Dimer Quantitative >20.00 (*)    CBC WITH PLATELETS AND DIFFERENTIAL - Abnormal    WBC Count 15.9 (*)     RBC Count 5.57      Hemoglobin 17.6      Hematocrit 51.2      MCV 92      MCH 31.6      MCHC 34.4      RDW 12.3      Platelet Count 335      % Neutrophils 74      % Lymphocytes 11      % Monocytes 11      % Eosinophils 1      % Basophils 1      % Immature Granulocytes 2      NRBCs per 100 WBC 0      Absolute Neutrophils 12.1 (*)     Absolute Lymphocytes 1.7      Absolute Monocytes 1.7 (*)     Absolute Eosinophils 0.1      Absolute Basophils 0.1      Absolute Immature Granulocytes 0.2      Absolute NRBCs 0.0     ISTAT GASES LACTATE VENOUS POCT - Abnormal    Lactic Acid POCT 3.2 (*)     Bicarbonate Venous POCT 27      O2 Sat, Venous POCT 37 (*)     pCO2V Venous POCT 46      pH Venous POCT 7.38      pO2 Venous POCT 23 (*)    PROCALCITONIN - Abnormal    Procalcitonin 19.83 (*)    CRP INFLAMMATION - Abnormal    CRP Inflammation 290.0 (*)    TROPONIN I - Normal    Troponin I High Sensitivity 8     MAGNESIUM - Normal    Magnesium 2.2     LACTIC ACID WHOLE BLOOD - Normal    Lactic Acid 1.0     GLUCOSE MONITOR NURSING POCT   D DIMER QUANTITATIVE   BLOOD CULTURE   BLOOD CULTURE   .   Treatments provided: see eMAR  Family Comments: NA  OBS brochure/video discussed/provided to patient:  N/A  ED Medications:   Medications   0.9% sodium chloride BOLUS (1,000 mLs Intravenous Not Given 1/1/22 2309)   doxycycline (VIBRAMYCIN) 100 mg vial to attach to  mL bag (0 mg Intravenous Stopped 1/2/22 0109)    enoxaparin ANTICOAGULANT (LOVENOX) injection 100 mg (has no administration in time range)   cefTRIAXone (ROCEPHIN) 2 g vial to attach to  ml bag for ADULTS or NS 50 ml bag for PEDS (has no administration in time range)   dexamethasone PF (DECADRON) injection 6 mg (has no administration in time range)   glucose gel 15-30 g (has no administration in time range)     Or   dextrose 50 % injection 25-50 mL (has no administration in time range)     Or   glucagon injection 1 mg (has no administration in time range)   Patient is already receiving anticoagulation with heparin, enoxaparin (LOVENOX), warfarin (COUMADIN)  or other anticoagulant medication (has no administration in time range)   acetaminophen (TYLENOL) Suppository 650 mg (has no administration in time range)   acetaminophen (TYLENOL) tablet 650 mg (has no administration in time range)     Or   acetaminophen (TYLENOL) solution 650 mg (has no administration in time range)   HYDROmorphone (PF) (DILAUDID) injection 0.3-0.5 mg (has no administration in time range)   ondansetron (ZOFRAN-ODT) ODT tab 4 mg (has no administration in time range)     Or   ondansetron (ZOFRAN) injection 4 mg (has no administration in time range)   bisacodyl (DULCOLAX) Suppository 10 mg (has no administration in time range)   remdesivir 100 mg in sodium chloride 0.9 % 250 mL intermittent infusion (has no administration in time range)     And   0.9% sodium chloride BOLUS (has no administration in time range)   sodium chloride 0.9% infusion ( Intravenous Rate/Dose Verify 1/2/22 0832)   dexamethasone PF (DECADRON) injection 6 mg (6 mg Intravenous Given 1/1/22 2310)   0.9% sodium chloride BOLUS (0 mLs Intravenous Stopped 1/2/22 0027)   cefTRIAXone (ROCEPHIN) 2 g vial to attach to  ml bag for ADULTS or NS 50 ml bag for PEDS (0 g Intravenous Stopped 1/2/22 0000)   magnesium sulfate 2 g in water intermittent infusion (0 g Intravenous Stopped 1/2/22 0130)   LORazepam (ATIVAN) injection  0.5 mg (0.5 mg Intravenous Given 1/1/22 8767)   enoxaparin ANTICOAGULANT (LOVENOX) injection 100 mg (100 mg Subcutaneous Given 1/2/22 8008)   remdesivir 200 mg in sodium chloride 0.9 % 250 mL intermittent infusion (0 mg Intravenous Stopped 1/2/22 0238)     Followed by   0.9% sodium chloride BOLUS (0 mLs Intravenous Stopped 1/2/22 0326)     Drips infusing:  Yes  For the majority of the shift, the patient's behavior Green. Interventions performed were NA.    Sepsis treatment initiated: No     Patient tested for COVID 19 prior to admission: YES    ED Nurse Name/Phone Number: Filomena Cespedes RN,   8:44 AM

## 2022-01-02 NOTE — ED NOTES
DATE:  1/1/2022   TIME OF RECEIPT FROM LAB:  4587  LAB TEST:  D-Dimer  LAB VALUE:  >20  RESULTS GIVEN WITH READ-BACK TO (PROVIDER):  Ronit Orozco MD  TIME LAB VALUE REPORTED TO PROVIDER:   7763

## 2022-01-02 NOTE — ED NOTES
Pt's wife requested update, update provided. Advised that pt will be staying at our ICU and will eventually be moved up there once a room is available, also explained that pt has a CT ordered. Pt's wife, duncan, verbalized understanding. Pt's wife requesting update regarding results when they are available.

## 2022-01-02 NOTE — PROGRESS NOTES
A BiPAP of  14/7 @ 100% was applied to the pt via the mask for an increase in WOB and/or SOB.  The bridge of the nose good , Pt is tolerating it well. Will continue to monitor and assess the pt's current respiratory status and needs.    Transport to CT . Nebs x 2 given inline.

## 2022-01-03 LAB
ALBUMIN SERPL-MCNC: 1.6 G/DL (ref 3.4–5)
ALP SERPL-CCNC: 85 U/L (ref 40–150)
ALT SERPL W P-5'-P-CCNC: 29 U/L (ref 0–70)
ANION GAP SERPL CALCULATED.3IONS-SCNC: 6 MMOL/L (ref 3–14)
AST SERPL W P-5'-P-CCNC: 26 U/L (ref 0–45)
ATRIAL RATE - MUSE: 141 BPM
BILIRUB SERPL-MCNC: 0.5 MG/DL (ref 0.2–1.3)
BUN SERPL-MCNC: 12 MG/DL (ref 7–30)
CALCIUM SERPL-MCNC: 7.7 MG/DL (ref 8.5–10.1)
CHLORIDE BLD-SCNC: 114 MMOL/L (ref 94–109)
CO2 SERPL-SCNC: 23 MMOL/L (ref 20–32)
CREAT SERPL-MCNC: 0.55 MG/DL (ref 0.66–1.25)
CRP SERPL-MCNC: 263 MG/L (ref 0–8)
D DIMER PPP FEU-MCNC: 8.13 UG/ML FEU (ref 0–0.5)
DIASTOLIC BLOOD PRESSURE - MUSE: NORMAL MMHG
ERYTHROCYTE [DISTWIDTH] IN BLOOD BY AUTOMATED COUNT: 12.8 % (ref 10–15)
GFR SERPL CREATININE-BSD FRML MDRD: >90 ML/MIN/1.73M2
GLUCOSE BLD-MCNC: 111 MG/DL (ref 70–99)
GLUCOSE BLDC GLUCOMTR-MCNC: 106 MG/DL (ref 70–99)
GLUCOSE BLDC GLUCOMTR-MCNC: 107 MG/DL (ref 70–99)
GLUCOSE BLDC GLUCOMTR-MCNC: 110 MG/DL (ref 70–99)
GLUCOSE BLDC GLUCOMTR-MCNC: 69 MG/DL (ref 70–99)
GLUCOSE BLDC GLUCOMTR-MCNC: 83 MG/DL (ref 70–99)
GLUCOSE BLDC GLUCOMTR-MCNC: 99 MG/DL (ref 70–99)
HCT VFR BLD AUTO: 44.6 % (ref 40–53)
HGB BLD-MCNC: 15.1 G/DL (ref 13.3–17.7)
INTERPRETATION ECG - MUSE: NORMAL
LACTATE SERPL-SCNC: 1.3 MMOL/L (ref 0.7–2)
MAGNESIUM SERPL-MCNC: 2.8 MG/DL (ref 1.6–2.3)
MCH RBC QN AUTO: 32 PG (ref 26.5–33)
MCHC RBC AUTO-ENTMCNC: 33.9 G/DL (ref 31.5–36.5)
MCV RBC AUTO: 95 FL (ref 78–100)
P AXIS - MUSE: 47 DEGREES
PHOSPHATE SERPL-MCNC: 1.8 MG/DL (ref 2.5–4.5)
PLATELET # BLD AUTO: 308 10E3/UL (ref 150–450)
POTASSIUM BLD-SCNC: 3.7 MMOL/L (ref 3.4–5.3)
PR INTERVAL - MUSE: 112 MS
PROT SERPL-MCNC: 6.6 G/DL (ref 6.8–8.8)
QRS DURATION - MUSE: 84 MS
QT - MUSE: 358 MS
QTC - MUSE: 548 MS
R AXIS - MUSE: 51 DEGREES
RBC # BLD AUTO: 4.72 10E6/UL (ref 4.4–5.9)
SODIUM SERPL-SCNC: 143 MMOL/L (ref 133–144)
SYSTOLIC BLOOD PRESSURE - MUSE: NORMAL MMHG
T AXIS - MUSE: 17 DEGREES
VENTRICULAR RATE- MUSE: 141 BPM
WBC # BLD AUTO: 15.1 10E3/UL (ref 4–11)

## 2022-01-03 PROCEDURE — 250N000013 HC RX MED GY IP 250 OP 250 PS 637: Performed by: INTERNAL MEDICINE

## 2022-01-03 PROCEDURE — 85379 FIBRIN DEGRADATION QUANT: CPT | Performed by: INTERNAL MEDICINE

## 2022-01-03 PROCEDURE — 120N000001 HC R&B MED SURG/OB

## 2022-01-03 PROCEDURE — 80053 COMPREHEN METABOLIC PANEL: CPT | Performed by: INTERNAL MEDICINE

## 2022-01-03 PROCEDURE — 258N000001 HC RX 258: Performed by: INTERNAL MEDICINE

## 2022-01-03 PROCEDURE — 120N000013 HC R&B IMCU

## 2022-01-03 PROCEDURE — 84100 ASSAY OF PHOSPHORUS: CPT | Performed by: INTERNAL MEDICINE

## 2022-01-03 PROCEDURE — 36415 COLL VENOUS BLD VENIPUNCTURE: CPT | Performed by: INTERNAL MEDICINE

## 2022-01-03 PROCEDURE — 250N000009 HC RX 250: Performed by: INTERNAL MEDICINE

## 2022-01-03 PROCEDURE — 85014 HEMATOCRIT: CPT | Performed by: INTERNAL MEDICINE

## 2022-01-03 PROCEDURE — 250N000013 HC RX MED GY IP 250 OP 250 PS 637: Performed by: SURGERY

## 2022-01-03 PROCEDURE — 250N000011 HC RX IP 250 OP 636: Performed by: INTERNAL MEDICINE

## 2022-01-03 PROCEDURE — 86140 C-REACTIVE PROTEIN: CPT | Performed by: INTERNAL MEDICINE

## 2022-01-03 PROCEDURE — 258N000003 HC RX IP 258 OP 636: Performed by: INTERNAL MEDICINE

## 2022-01-03 PROCEDURE — 83735 ASSAY OF MAGNESIUM: CPT | Performed by: INTERNAL MEDICINE

## 2022-01-03 PROCEDURE — 99233 SBSQ HOSP IP/OBS HIGH 50: CPT | Performed by: INTERNAL MEDICINE

## 2022-01-03 PROCEDURE — 83605 ASSAY OF LACTIC ACID: CPT | Performed by: INTERNAL MEDICINE

## 2022-01-03 PROCEDURE — 999N000157 HC STATISTIC RCP TIME EA 10 MIN

## 2022-01-03 PROCEDURE — 94660 CPAP INITIATION&MGMT: CPT

## 2022-01-03 RX ORDER — GABAPENTIN 300 MG/1
600 CAPSULE ORAL EVERY 8 HOURS
Status: COMPLETED | OUTPATIENT
Start: 2022-01-03 | End: 2022-01-05

## 2022-01-03 RX ORDER — GABAPENTIN 300 MG/1
300 CAPSULE ORAL EVERY 8 HOURS
Status: DISCONTINUED | OUTPATIENT
Start: 2022-01-06 | End: 2022-01-05 | Stop reason: HOSPADM

## 2022-01-03 RX ORDER — LIDOCAINE 40 MG/G
CREAM TOPICAL
Status: DISCONTINUED | OUTPATIENT
Start: 2022-01-03 | End: 2022-01-05 | Stop reason: HOSPADM

## 2022-01-03 RX ORDER — FOLIC ACID 1 MG/1
1 TABLET ORAL DAILY
Status: DISCONTINUED | OUTPATIENT
Start: 2022-01-03 | End: 2022-01-05 | Stop reason: HOSPADM

## 2022-01-03 RX ORDER — CLONIDINE HYDROCHLORIDE 0.1 MG/1
0.1 TABLET ORAL EVERY 8 HOURS
Status: DISCONTINUED | OUTPATIENT
Start: 2022-01-03 | End: 2022-01-05 | Stop reason: HOSPADM

## 2022-01-03 RX ORDER — DEXTROSE MONOHYDRATE 25 G/50ML
25-50 INJECTION, SOLUTION INTRAVENOUS
Status: DISCONTINUED | OUTPATIENT
Start: 2022-01-03 | End: 2022-01-03

## 2022-01-03 RX ORDER — LORAZEPAM 1 MG/1
1-2 TABLET ORAL EVERY 30 MIN PRN
Status: DISCONTINUED | OUTPATIENT
Start: 2022-01-03 | End: 2022-01-05 | Stop reason: HOSPADM

## 2022-01-03 RX ORDER — NITROGLYCERIN 0.4 MG/1
0.4 TABLET SUBLINGUAL EVERY 5 MIN PRN
Status: DISCONTINUED | OUTPATIENT
Start: 2022-01-03 | End: 2022-01-05 | Stop reason: HOSPADM

## 2022-01-03 RX ORDER — ALPRAZOLAM 1 MG
1 TABLET ORAL ONCE
Status: COMPLETED | OUTPATIENT
Start: 2022-01-03 | End: 2022-01-03

## 2022-01-03 RX ORDER — HALOPERIDOL 5 MG/ML
2.5-5 INJECTION INTRAMUSCULAR EVERY 6 HOURS PRN
Status: DISCONTINUED | OUTPATIENT
Start: 2022-01-03 | End: 2022-01-05 | Stop reason: HOSPADM

## 2022-01-03 RX ORDER — HYDROXYZINE HYDROCHLORIDE 25 MG/1
25 TABLET, FILM COATED ORAL EVERY 6 HOURS PRN
Status: DISCONTINUED | OUTPATIENT
Start: 2022-01-03 | End: 2022-01-05 | Stop reason: HOSPADM

## 2022-01-03 RX ORDER — OLANZAPINE 5 MG/1
5-10 TABLET, ORALLY DISINTEGRATING ORAL EVERY 6 HOURS PRN
Status: DISCONTINUED | OUTPATIENT
Start: 2022-01-03 | End: 2022-01-05 | Stop reason: HOSPADM

## 2022-01-03 RX ORDER — ALLOPURINOL 100 MG/1
100 TABLET ORAL DAILY
Status: DISCONTINUED | OUTPATIENT
Start: 2022-01-03 | End: 2022-01-05 | Stop reason: HOSPADM

## 2022-01-03 RX ORDER — MULTIPLE VITAMINS W/ MINERALS TAB 9MG-400MCG
1 TAB ORAL DAILY
Status: DISCONTINUED | OUTPATIENT
Start: 2022-01-03 | End: 2022-01-05 | Stop reason: HOSPADM

## 2022-01-03 RX ORDER — LORAZEPAM 2 MG/ML
1-2 INJECTION INTRAMUSCULAR EVERY 30 MIN PRN
Status: DISCONTINUED | OUTPATIENT
Start: 2022-01-03 | End: 2022-01-05 | Stop reason: HOSPADM

## 2022-01-03 RX ORDER — NICOTINE POLACRILEX 4 MG
15-30 LOZENGE BUCCAL
Status: DISCONTINUED | OUTPATIENT
Start: 2022-01-03 | End: 2022-01-03

## 2022-01-03 RX ORDER — FLUMAZENIL 0.1 MG/ML
0.2 INJECTION, SOLUTION INTRAVENOUS
Status: DISCONTINUED | OUTPATIENT
Start: 2022-01-03 | End: 2022-01-05 | Stop reason: HOSPADM

## 2022-01-03 RX ADMIN — SODIUM CHLORIDE 50 ML: 9 INJECTION, SOLUTION INTRAVENOUS at 18:13

## 2022-01-03 RX ADMIN — CLONIDINE HYDROCHLORIDE 0.1 MG: 0.1 TABLET ORAL at 14:38

## 2022-01-03 RX ADMIN — THIAMINE HCL TAB 100 MG 100 MG: 100 TAB at 14:38

## 2022-01-03 RX ADMIN — DEXTROSE MONOHYDRATE 25 ML: 25 INJECTION, SOLUTION INTRAVENOUS at 04:32

## 2022-01-03 RX ADMIN — DEXAMETHASONE SODIUM PHOSPHATE 6 MG: 10 INJECTION INTRAMUSCULAR; INTRAVENOUS at 12:37

## 2022-01-03 RX ADMIN — DOXYCYCLINE 100 MG: 100 INJECTION, POWDER, LYOPHILIZED, FOR SOLUTION INTRAVENOUS at 10:26

## 2022-01-03 RX ADMIN — ACETAMINOPHEN 650 MG: 325 TABLET, FILM COATED ORAL at 16:11

## 2022-01-03 RX ADMIN — ENOXAPARIN SODIUM 100 MG: 100 INJECTION SUBCUTANEOUS at 00:32

## 2022-01-03 RX ADMIN — REMDESIVIR 100 MG: 100 INJECTION, POWDER, LYOPHILIZED, FOR SOLUTION INTRAVENOUS at 17:29

## 2022-01-03 RX ADMIN — CLONIDINE HYDROCHLORIDE 0.1 MG: 0.1 TABLET ORAL at 21:36

## 2022-01-03 RX ADMIN — GABAPENTIN 600 MG: 300 CAPSULE ORAL at 14:38

## 2022-01-03 RX ADMIN — POTASSIUM & SODIUM PHOSPHATES POWDER PACK 280-160-250 MG 2 PACKET: 280-160-250 PACK at 23:27

## 2022-01-03 RX ADMIN — ACETAMINOPHEN 650 MG: 325 TABLET, FILM COATED ORAL at 05:04

## 2022-01-03 RX ADMIN — HYDROMORPHONE HYDROCHLORIDE 0.5 MG: 1 INJECTION, SOLUTION INTRAMUSCULAR; INTRAVENOUS; SUBCUTANEOUS at 12:38

## 2022-01-03 RX ADMIN — HYDROMORPHONE HYDROCHLORIDE 0.3 MG: 1 INJECTION, SOLUTION INTRAMUSCULAR; INTRAVENOUS; SUBCUTANEOUS at 06:57

## 2022-01-03 RX ADMIN — MULTIPLE VITAMINS W/ MINERALS TAB 1 TABLET: TAB at 14:38

## 2022-01-03 RX ADMIN — NICOTINE 1 PATCH: 21 PATCH, EXTENDED RELEASE TRANSDERMAL at 08:03

## 2022-01-03 RX ADMIN — CEFTRIAXONE 2 G: 2 INJECTION, POWDER, FOR SOLUTION INTRAMUSCULAR; INTRAVENOUS at 21:32

## 2022-01-03 RX ADMIN — FOLIC ACID 1 MG: 1 TABLET ORAL at 14:38

## 2022-01-03 RX ADMIN — ACETAMINOPHEN 650 MG: 325 TABLET, FILM COATED ORAL at 23:27

## 2022-01-03 RX ADMIN — ENOXAPARIN SODIUM 40 MG: 40 INJECTION SUBCUTANEOUS at 12:37

## 2022-01-03 RX ADMIN — LORAZEPAM 1 MG: 1 TABLET ORAL at 16:12

## 2022-01-03 RX ADMIN — GABAPENTIN 600 MG: 300 CAPSULE ORAL at 21:36

## 2022-01-03 RX ADMIN — ALLOPURINOL 100 MG: 100 TABLET ORAL at 10:19

## 2022-01-03 RX ADMIN — ALPRAZOLAM 1 MG: 1 TABLET ORAL at 02:24

## 2022-01-03 RX ADMIN — POTASSIUM CHLORIDE AND SODIUM CHLORIDE: 900; 150 INJECTION, SOLUTION INTRAVENOUS at 08:05

## 2022-01-03 RX ADMIN — POTASSIUM & SODIUM PHOSPHATES POWDER PACK 280-160-250 MG 2 PACKET: 280-160-250 PACK at 18:41

## 2022-01-03 ASSESSMENT — ACTIVITIES OF DAILY LIVING (ADL)
ADLS_ACUITY_SCORE: 3

## 2022-01-03 NOTE — PLAN OF CARE
ICU End of Shift Summary.  For vital signs and complete assessments, please see documentation flowsheets.     Pertinent assessments: Patient tolerated being on high flow at 50 L of flow at 50% FiO2.  He has been very anxious all night and has not slept at all.  Xanax ordered per telehub and this did not seem to make much difference.  He did receive prn dilaudid and he stated it only helped a little but he did look visibly relatively more comfortable.  SpO2 drops to low 90s when he is talking.  His sats dropped when he walked a few steps from the ED cot to the icu bed.  Mouth swabs and mouth moisturizer given to patient- he has complained frequently of dry mouth.    Major Shift Events: Off from Bipap to high flow    Plan (Upcoming Events):   Discharge/Transfer Needs: Possibly go to floor today if continues to do well.    Bedside Shift Report Completed :   Bedside Safety Check Completed:

## 2022-01-03 NOTE — PROVIDER NOTIFICATION
Paged Dr. Palmer to discuss back pain unrelieved by Dilaudid and pt's reported use of ETOH (4 16oz drinks/night x6 days a week). Awaiting call back.

## 2022-01-03 NOTE — ED NOTES
Called pt's wife Whitley and gave an update regarding CT and plan of care. Pt's wife verbalized understanding, she would like a call once he is moved to the ICU. This will be passed on to next primary RN.

## 2022-01-03 NOTE — PROGRESS NOTES
Switched pt to HFNC 40 LPM @ 50% from BiPAP.  Will continue to wean pt as tolerated.    Keiko Antoine, RT

## 2022-01-03 NOTE — CONSULTS
Care Management Initial Consult    General Information  Assessment completed with: Spouse or significant other,    Type of CM/SW Visit: Initial Assessment    Primary Care Provider verified and updated as needed:     Readmission within the last 30 days:        Reason for Consult: discharge planning  Advance Care Planning:            Communication Assessment  Patient's communication style: spoken language (English or Bilingual)    Hearing Difficulty or Deaf: no   Wear Glasses or Blind: no    Cognitive  Cognitive/Neuro/Behavioral: .WDL except  Level of Consciousness: alert  Arousal Level: opens eyes spontaneously  Orientation: oriented x 4  Mood/Behavior: anxious  Best Language: 0 - No aphasia  Speech: logical,spontaneous,clear    Living Environment:   People in home: child(chapincito), dependent,spouse     Current living Arrangements: house      Able to return to prior arrangements: yes       Family/Social Support:  Care provided by: self,child(chapincito)  Provides care for:    Marital Status:   Wife          Description of Support System: Supportive,Involved         Current Resources:   Patient receiving home care services: No     Community Resources: None  Equipment currently used at home: none  Supplies currently used at home:      Employment/Financial:  Employment Status: employed part-time        Financial Concerns: No concerns identified           Lifestyle & Psychosocial Needs:  Social Determinants of Health     Tobacco Use: Not on file   Alcohol Use: Not on file   Financial Resource Strain: Not on file   Food Insecurity: Not on file   Transportation Needs: Not on file   Physical Activity: Not on file   Stress: Not on file   Social Connections: Not on file   Intimate Partner Violence: Not on file   Depression: Not on file   Housing Stability: Not on file       Functional Status:  Prior to admission patient needed assistance: no       Mental Health Status:  Mental Health Status: No Current Concerns       Chemical  Dependency Status:  Chemical Dependency Status: Current Concern             Values/Beliefs:  Spiritual, Cultural Beliefs, Yazidism Practices, Values that affect care:                 Additional Information:  Tried calling pt on his cell phone d/t covid status, however, no answer. I did speak with his wife and she reports pt has been in a lot of pain so she hasn't even talked to him.  She reports they have a 14 yo son, live in a house, pt works part time for a cleaning company, wife works full time.  No current services. Pt drives.     Wife reports pt has a couple of drinks each night after work but she does not feel his use is problematic.  Pt has never had tx. Chem Dep consult for R25, however, pt does not qualify for a R25 because he has insurance coverage through his wife insurance.     Wife will be able to  pt at time of discharge.     Plan: SW will continue to follow and assist with discharge needs.    Patti PURDY, [facility]  Inpatient Care Coordination   [facility]   [phone]

## 2022-01-03 NOTE — PROGRESS NOTES
[facility]  Respiratory Care Note      The HFNC continues to be applied to the  pt @ 45 LPM and 50% for PEEP therapy, with the BiPAP in standby. Skin looks good and remains intact. Will continue to monitor and assess the pt's current respiratory status and needs, in hopes of liberating him from the HFNC.     Vital signs:  Temp: 98.3  F (36.8  C) Temp src: Temporal BP: 134/88 Pulse: 98   Resp: 27 SpO2: 98 % O2 Device: High Flow Nasal Cannula (HFNC) Oxygen Delivery: 45 LPM   Weight: 92 kg (202 lb 13.2 oz)  There is no height or weight on file to calculate BMI.      No past medical history on file.    No past surgical history on file.    No family history on file.    Social History     Tobacco Use     Smoking status: Not on file     Smokeless tobacco: Not on file   Substance Use Topics     Alcohol use: Not on file     Chaparro HYLTON  [facility]  1/3/2022        .

## 2022-01-03 NOTE — PROGRESS NOTES
[facility]  Hospitalist Progress Note  Admit 1/1/2022 10:51 PM    Name: Greg Sage    MRN: [id number]  Provider:  Sreekanth Palmer MD, [facility]    Date of Service: 01/03/2022     Reason for Stay (Diagnosis): Acute respiratory failure secondary to COVID-19 pneumonia         Summary of hospital stay & Assessment/Plan:   Summary of Stay: Greg Sage is a 36 year old male who was admitted on 1/1/2022  36 year old male who is not vaccinated against Covid but denies any known medical problems who presents with severe shortness of breath, tachycardia and left-sided chest pain in the setting of approximately 9 days of symptoms of COVID-19.  He reports that he tested +9 days ago and continued to have cough, body aches and shortness of breath.    Problem List:   1. Severe hypoxemic respiratory failure due to COVID-19 pneumonia, rule out PE, rule out bacterial superinfection significantly elevated procalcitonin  2. History of vaping although denies during illness however wife reports heavy use cannot rule out vaping injury if clinically not improving as expected consider pulmonary consultation  3. Profound elevated D-dimer initial concern for PE, CT chest PE protocol was negative for pulmonary embolism likely from severe Covid pneumonia  4. Sepsis secondary to severe Covid pneumonia  5. Heavy alcohol use, started ETOH protocol, apparently has been treating low back pain with heavy alcohol use almost every night more than 16 ounces.  We will try gabapentin for both alcohol withdrawal, he is shaky and restless but also can help with his back pain.  We will also consult CD.    Transition to IMC from ICU  Allow p.o. intake if stable on high flow  Continue Decadron and remdesivir  Decrease Lovenox to 40 mg twice daily I would keep him on a higher dose prophylactically because of significant D-dimer elevation however he does not need full therapeutic dose was negative CT angiogram and no evidence of RV strain  on echocardiogram  Continue remdesivir  Continue pneumonia coverage with initial procalcitonin almost 20      DVT Prophylaxis: Enoxaparin (Lovenox) SQ  Code Status:  Full Code    Disposition Plan     Expected discharge in 3-4 days to prior living arrangement once oxygen requirement is 4 L/min or less for  24 hours.       Entered: Sreekanth Palmer 01/03/2022, 9:48 AM       Wife updated by phone            Interval History:       Reports improvement in breathing today, transition to high flow oxygen from BiPAP and seems to be stable doing well, most is dry he wants to drink  Today's plan detailed above discussed with nursing               Physical Exam:   Physical Exam   Temp: 99.2  F (37.3  C) Temp src: Axillary BP: (!) 139/97 Pulse: 104   Resp: 24 SpO2: 100 % O2 Device: High Flow Nasal Cannula (HFNC) Oxygen Delivery: 45 LPM  Vitals:    01/01/22 2300 01/01/22 2359 01/03/22 0000   Weight: 99.8 kg (220 lb) 99.8 kg (220 lb) 92 kg (202 lb 13.2 oz)     I/O last 3 completed shifts:  In: 1775 [I.V.:1775]  Out: 900 [Urine:900]      GENERAL:  Comfortable.   PSYCH: pleasant, oriented, No acute distress.  EYES: PERRLA, Normal conjunctiva.  HEART:  Normal S1, S2 with no edema.  LUNGS: Scattered rhonchi and rales decreased basilar sounds, normal Respiratory effort.  ABDOMEN:  Soft, no hepatosplenomegaly, normal bowel sounds.  SKIN:  Dry to touch, No rash.  Neuro: Non focal with normal motor power, sensation, CN's and Reflexes.    Medications     0.9% sodium chloride + KCl 20 mEq/L 100 mL/hr at 01/03/22 0805     - MEDICATION INSTRUCTIONS -         remdesivir  100 mg Intravenous Q24H    And     sodium chloride 0.9%  50 mL Intravenous Q24H     sodium chloride 0.9%  1,000 mL Intravenous Once     allopurinol  100 mg Oral Daily     cefTRIAXone  2 g Intravenous Q24H     dexamethasone  6 mg Intravenous Q24H     doxycycline (VIBRAMYCIN) IV  100 mg Intravenous Q12H     enoxaparin ANTICOAGULANT  1 mg/kg Subcutaneous Q12H     nicotine  1  patch Transdermal Daily     nicotine   Transdermal Q8H     sodium chloride (PF)  3 mL Intracatheter Q8H     Data     -Data reviewed today:  I personally reviewed  all new labs and imaging results over the last 24 hours.    Recent Labs   Lab 22  0522  2300   WBC 15.1* 15.9*   HGB 15.1 17.6   HCT 44.6 51.2   MCV 95 92    335     Recent Labs   Lab 22  0801 22  0530 22  0519 22  0031 22  0707 22  2300   NA  --  143  --   --  141 135   POTASSIUM  --  3.7  --   --  3.8 3.5   CHLORIDE  --  114*  --   --  107 101   CO2  --  23  --   --  25 25   ANIONGAP  --  6  --   --  9 9   GLC 99 111* 106*   < > 110* 132*   BUN  --  12  --   --  7 7   CR  --  0.55*  --   --  0.63* 0.66   GFRESTIMATED  --  >90  --   --  >90 >90   LUDIN  --  7.7*  --   --  8.3* 8.7    < > = values in this interval not displayed.       Recent Results (from the past 24 hour(s))   Echocardiogram Complete   Result Value    LVEF  60-65%    Narrative    [id number]  [id number]  [id number]  [id number]^BRANDON^BRIGIDO^ROYCE     [facility]  Echocardiography Laboratory  [address] [place] [address]     Name: JAEL LAWSON  MRN: [id number]  : 1985  Study Date: 2022 09:55 AM  Age: 36 yrs  Gender: Male  Patient Location: [facility]  Reason For Study: Pulmonary Emboli  Ordering Physician: BRIGIDO TAYLOR  Performed By: Candy Baltazar     BSA: 2.2 m2  Height: 73 in  Weight: 220 lb  HR: 106  BP: 131/87 mmHg  ______________________________________________________________________________  Procedure  Complete Portable Echo Adult.  ______________________________________________________________________________  Interpretation Summary     Left ventricular size, global systolic function, and wall motion are normal,  estimated LVEF 60-65%.  Right ventricle is not well visualized, however it appears borderline  enlarged, global systolic function is probably normal.  No  significant valvular abnormalities.  The inferior vena cava was normal in size with preserved respiratory  variability.     There are no prior studies available for comparison.     ______________________________________________________________________________  Left Ventricle  The left ventricle is normal in size. There is normal left ventricular wall  thickness. Left ventricular systolic function is normal. The visual ejection  fraction is 60-65%. Left ventricular diastolic function is normal. No regional  wall motion abnormalities noted.     Right Ventricle  The right ventricle is not well visualized. Right ventricle is not well  visualized, however it appears borderline enlarged, global systolic function  is probably normal.     Atria  Normal left atrial size. Right atrial size is normal.     Mitral Valve  The mitral valve is normal in structure and function.     Tricuspid Valve  The tricuspid valve is not well visualized, but is grossly normal. Right  ventricular systolic pressure could not be approximated due to inadequate  tricuspid regurgitation.     Aortic Valve  There is mild trileaflet aortic sclerosis.     Pulmonic Valve  The pulmonic valve is not well seen, but is grossly normal.     Vessels  The aortic root is normal size. Normal size ascending aorta. The inferior vena  cava was normal in size with preserved respiratory variability.     Pericardium  There is no pericardial effusion.     ______________________________________________________________________________  MMode/2D Measurements & Calculations  IVSd: 1.1 cm  LVIDd: 4.5 cm  LVIDs: 3.2 cm  LVPWd: 0.77 cm  FS: 29.7 %     LV mass(C)d: 144.6 grams  LV mass(C)dI: 64.5 grams/m2  Ao root diam: 3.0 cm  LA dimension: 3.7 cm  LA/Ao: 1.2  LVOT diam: 2.2 cm  LVOT area: 4.0 cm2  LA Volume (BP): 36.4 ml  LA Volume Index (BP): 16.2 ml/m2  RWT: 0.34     Doppler Measurements & Calculations  MV E max aleida: 72.3 cm/sec  MV A max aleida: 83.4 cm/sec  MV E/A: 0.87  MV dec  time: 0.10 sec     LV V1 max PG: 3.9 mmHg  LV V1 max: 99.0 cm/sec  LV V1 VTI: 18.9 cm  SV(LVOT): 74.8 ml  SI(LVOT): 33.4 ml/m2  PA acc time: 0.12 sec  E/E' av.6  Lateral E/e': 5.1  Medial E/e': 6.2     ______________________________________________________________________________  Report approved by: Ronak Mccormick 2022 12:08 PM         CT Chest Pulmonary Embolism w Contrast    Narrative    EXAM: CT CHEST PULMONARY EMBOLISM W CONTRAST  LOCATION: [facility]  DATE/TIME: 2022 4:56 PM    INDICATION: PE suspected, high prob. Severe shortness of breath and tachycardia.  COMPARISON: Chest x-ray 2022.  TECHNIQUE: CT chest pulmonary angiogram during arterial phase injection of IV contrast. Multiplanar reformats and MIP reconstructions were performed. Dose reduction techniques were used.   CONTRAST: 71 mL [id number]    FINDINGS:  ANGIOGRAM CHEST: Pulmonary arteries are normal caliber and negative for pulmonary emboli. Thoracic aorta is negative for dissection.    LUNGS AND PLEURA: Severe dense multifocal bilateral pulmonary consolidation involving all lobes of both lungs but dominantly seen at the lung bases. No effusions.    MEDIASTINUM/AXILLAE: Multiple enlarged lymph nodes at the mediastinum and bilateral hilar locations. Subcarinal lymph node is 1.4 cm series 7 image 151. There are other examples. No additional acute mediastinal abnormality.    CORONARY ARTERY CALCIFICATION: None.    UPPER ABDOMEN: Diffuse hepatic steatosis. No acute upper abdominal abnormality.    MUSCULOSKELETAL: No acute abnormality.      Impression    IMPRESSION:  1.  Severe dense bilateral consolidation consistent with severe pneumonia.  2.  No evidence for pulmonary embolism.  3.  Multiple enlarged chest lymph nodes.       This document was produced using voice recognition software

## 2022-01-03 NOTE — PROGRESS NOTES
Pt on minimal settings on Bipap, switched to HFNC 40lpm 50% . Pt maintaining oxygen saturations > 97%, no increase work of breathing noted. Bipap on standby .

## 2022-01-04 LAB
ALBUMIN SERPL-MCNC: 1.6 G/DL (ref 3.4–5)
ALP SERPL-CCNC: 89 U/L (ref 40–150)
ALT SERPL W P-5'-P-CCNC: 34 U/L (ref 0–70)
ANION GAP SERPL CALCULATED.3IONS-SCNC: 6 MMOL/L (ref 3–14)
AST SERPL W P-5'-P-CCNC: 34 U/L (ref 0–45)
BILIRUB SERPL-MCNC: 0.4 MG/DL (ref 0.2–1.3)
BUN SERPL-MCNC: 12 MG/DL (ref 7–30)
CALCIUM SERPL-MCNC: 8.2 MG/DL (ref 8.5–10.1)
CHLORIDE BLD-SCNC: 109 MMOL/L (ref 94–109)
CO2 SERPL-SCNC: 24 MMOL/L (ref 20–32)
CREAT SERPL-MCNC: 0.59 MG/DL (ref 0.66–1.25)
CRP SERPL-MCNC: 173 MG/L (ref 0–8)
D DIMER PPP FEU-MCNC: 4.49 UG/ML FEU (ref 0–0.5)
ERYTHROCYTE [DISTWIDTH] IN BLOOD BY AUTOMATED COUNT: 13 % (ref 10–15)
GFR SERPL CREATININE-BSD FRML MDRD: >90 ML/MIN/1.73M2
GLUCOSE BLD-MCNC: 93 MG/DL (ref 70–99)
HCT VFR BLD AUTO: 42.1 % (ref 40–53)
HGB BLD-MCNC: 13.8 G/DL (ref 13.3–17.7)
MCH RBC QN AUTO: 31.4 PG (ref 26.5–33)
MCHC RBC AUTO-ENTMCNC: 32.8 G/DL (ref 31.5–36.5)
MCV RBC AUTO: 96 FL (ref 78–100)
PHOSPHATE SERPL-MCNC: 3.5 MG/DL (ref 2.5–4.5)
PLATELET # BLD AUTO: 385 10E3/UL (ref 150–450)
POTASSIUM BLD-SCNC: 3.9 MMOL/L (ref 3.4–5.3)
PROT SERPL-MCNC: 6.5 G/DL (ref 6.8–8.8)
RBC # BLD AUTO: 4.4 10E6/UL (ref 4.4–5.9)
SODIUM SERPL-SCNC: 139 MMOL/L (ref 133–144)
WBC # BLD AUTO: 14.5 10E3/UL (ref 4–11)

## 2022-01-04 PROCEDURE — 258N000003 HC RX IP 258 OP 636: Performed by: INTERNAL MEDICINE

## 2022-01-04 PROCEDURE — 999N000216 HC STATISTIC ADULT CD FACE TO FACE-NO CHRG

## 2022-01-04 PROCEDURE — 250N000011 HC RX IP 250 OP 636: Performed by: INTERNAL MEDICINE

## 2022-01-04 PROCEDURE — 86140 C-REACTIVE PROTEIN: CPT | Performed by: INTERNAL MEDICINE

## 2022-01-04 PROCEDURE — 99233 SBSQ HOSP IP/OBS HIGH 50: CPT | Performed by: INTERNAL MEDICINE

## 2022-01-04 PROCEDURE — 250N000013 HC RX MED GY IP 250 OP 250 PS 637: Performed by: INTERNAL MEDICINE

## 2022-01-04 PROCEDURE — 120N000013 HC R&B IMCU

## 2022-01-04 PROCEDURE — 36415 COLL VENOUS BLD VENIPUNCTURE: CPT | Performed by: INTERNAL MEDICINE

## 2022-01-04 PROCEDURE — 99207 PR NO BILLABLE SERVICE THIS VISIT: CPT | Performed by: INTERNAL MEDICINE

## 2022-01-04 PROCEDURE — 80053 COMPREHEN METABOLIC PANEL: CPT | Performed by: INTERNAL MEDICINE

## 2022-01-04 PROCEDURE — 85014 HEMATOCRIT: CPT | Performed by: INTERNAL MEDICINE

## 2022-01-04 PROCEDURE — 85379 FIBRIN DEGRADATION QUANT: CPT | Performed by: INTERNAL MEDICINE

## 2022-01-04 PROCEDURE — 250N000009 HC RX 250: Performed by: INTERNAL MEDICINE

## 2022-01-04 PROCEDURE — 84100 ASSAY OF PHOSPHORUS: CPT | Performed by: INTERNAL MEDICINE

## 2022-01-04 RX ORDER — DEXAMETHASONE 6 MG/1
6 TABLET ORAL DAILY
Qty: 7 TABLET | Refills: 0 | Status: SHIPPED | OUTPATIENT
Start: 2022-01-04 | End: 2022-01-16

## 2022-01-04 RX ORDER — DEXAMETHASONE SODIUM PHOSPHATE 10 MG/ML
10 INJECTION, SOLUTION INTRAMUSCULAR; INTRAVENOUS EVERY 24 HOURS
Status: DISCONTINUED | OUTPATIENT
Start: 2022-01-05 | End: 2022-01-05 | Stop reason: HOSPADM

## 2022-01-04 RX ORDER — DOXYCYCLINE HYCLATE 100 MG
100 TABLET ORAL 2 TIMES DAILY
Qty: 14 TABLET | Refills: 0 | Status: SHIPPED | OUTPATIENT
Start: 2022-01-04 | End: 2022-01-11

## 2022-01-04 RX ORDER — ALBUTEROL SULFATE 90 UG/1
2 AEROSOL, METERED RESPIRATORY (INHALATION) EVERY 6 HOURS
Qty: 18 G | Refills: 0 | Status: SHIPPED | OUTPATIENT
Start: 2022-01-04

## 2022-01-04 RX ORDER — CEFUROXIME AXETIL 500 MG/1
500 TABLET ORAL 2 TIMES DAILY
Qty: 10 TABLET | Refills: 0 | Status: SHIPPED | OUTPATIENT
Start: 2022-01-04 | End: 2022-01-09

## 2022-01-04 RX ORDER — DEXAMETHASONE 4 MG/1
4 TABLET ORAL ONCE
Status: COMPLETED | OUTPATIENT
Start: 2022-01-04 | End: 2022-01-04

## 2022-01-04 RX ADMIN — CLONIDINE HYDROCHLORIDE 0.1 MG: 0.1 TABLET ORAL at 22:15

## 2022-01-04 RX ADMIN — POTASSIUM & SODIUM PHOSPHATES POWDER PACK 280-160-250 MG 2 PACKET: 280-160-250 PACK at 09:21

## 2022-01-04 RX ADMIN — CEFTRIAXONE 2 G: 2 INJECTION, POWDER, FOR SOLUTION INTRAMUSCULAR; INTRAVENOUS at 22:18

## 2022-01-04 RX ADMIN — GABAPENTIN 600 MG: 300 CAPSULE ORAL at 06:02

## 2022-01-04 RX ADMIN — DOXYCYCLINE 100 MG: 100 INJECTION, POWDER, LYOPHILIZED, FOR SOLUTION INTRAVENOUS at 01:29

## 2022-01-04 RX ADMIN — MENTHOL 1 PATCH: 205.5 PATCH TOPICAL at 01:14

## 2022-01-04 RX ADMIN — DEXAMETHASONE SODIUM PHOSPHATE 6 MG: 10 INJECTION INTRAMUSCULAR; INTRAVENOUS at 11:24

## 2022-01-04 RX ADMIN — HYDROMORPHONE HYDROCHLORIDE 0.3 MG: 1 INJECTION, SOLUTION INTRAMUSCULAR; INTRAVENOUS; SUBCUTANEOUS at 04:43

## 2022-01-04 RX ADMIN — GABAPENTIN 600 MG: 300 CAPSULE ORAL at 22:08

## 2022-01-04 RX ADMIN — HYDROXYZINE HYDROCHLORIDE 25 MG: 25 TABLET, FILM COATED ORAL at 01:52

## 2022-01-04 RX ADMIN — ALLOPURINOL 100 MG: 100 TABLET ORAL at 09:21

## 2022-01-04 RX ADMIN — NICOTINE 1 PATCH: 21 PATCH, EXTENDED RELEASE TRANSDERMAL at 09:21

## 2022-01-04 RX ADMIN — ENOXAPARIN SODIUM 40 MG: 40 INJECTION SUBCUTANEOUS at 01:37

## 2022-01-04 RX ADMIN — ACETAMINOPHEN 650 MG: 325 TABLET, FILM COATED ORAL at 13:54

## 2022-01-04 RX ADMIN — GABAPENTIN 600 MG: 300 CAPSULE ORAL at 13:52

## 2022-01-04 RX ADMIN — CLONIDINE HYDROCHLORIDE 0.1 MG: 0.1 TABLET ORAL at 13:52

## 2022-01-04 RX ADMIN — DOXYCYCLINE 100 MG: 100 INJECTION, POWDER, LYOPHILIZED, FOR SOLUTION INTRAVENOUS at 22:56

## 2022-01-04 RX ADMIN — MULTIPLE VITAMINS W/ MINERALS TAB 1 TABLET: TAB at 09:20

## 2022-01-04 RX ADMIN — REMDESIVIR 100 MG: 100 INJECTION, POWDER, LYOPHILIZED, FOR SOLUTION INTRAVENOUS at 11:23

## 2022-01-04 RX ADMIN — THIAMINE HCL TAB 100 MG 100 MG: 100 TAB at 09:20

## 2022-01-04 RX ADMIN — DOXYCYCLINE 100 MG: 100 INJECTION, POWDER, LYOPHILIZED, FOR SOLUTION INTRAVENOUS at 12:57

## 2022-01-04 RX ADMIN — FOLIC ACID 1 MG: 1 TABLET ORAL at 09:20

## 2022-01-04 RX ADMIN — SODIUM CHLORIDE 50 ML: 9 INJECTION, SOLUTION INTRAVENOUS at 11:25

## 2022-01-04 RX ADMIN — ENOXAPARIN SODIUM 40 MG: 40 INJECTION SUBCUTANEOUS at 11:24

## 2022-01-04 RX ADMIN — DEXAMETHASONE 4 MG: 4 TABLET ORAL at 12:57

## 2022-01-04 RX ADMIN — CLONIDINE HYDROCHLORIDE 0.1 MG: 0.1 TABLET ORAL at 06:02

## 2022-01-04 ASSESSMENT — ACTIVITIES OF DAILY LIVING (ADL)
ADLS_ACUITY_SCORE: 3

## 2022-01-04 ASSESSMENT — MIFFLIN-ST. JEOR: SCORE: 1950.03

## 2022-01-04 NOTE — PROGRESS NOTES
I certify that this patient, Greg Sage has been under my care (or a nurse practitioner or physican's assistant working with me). This is the face-to-face encounter for oxygen medical necessity.      Greg Sage is now in a chronic stable state and continues to require supplemental oxygen. Patient has continued oxygen desaturation due to COVID 19 pneumonia.    Alternative treatment(s) tried or considered and deemed clinically infective for treatment of CO VIA 19 Pneumonia include inhalers, steroids and pulmonary toileting.  If portability is ordered, is the patient mobile within the home? yes    **Patients who qualify for home O2 coverage under the CMS guidelines require ABG tests or O2 sat readings obtained closest to, but no earlier than 2 days prior to the discharge, as evidence of the need for home oxygen therapy. Testing must be performed while patient is in the chronic stable state. See notes for O2 sats.**

## 2022-01-04 NOTE — PLAN OF CARE
A&Ox4. VSS ex tachycardic at times. Nasal cannula 2 L sats 90-92%. Mild pain reported in back, tylenol given. IV remedesivir, doxycycline. Up independently. Not scoring on CIWA. Possible discharge home with oxygen tomorrow. Wife updated per phone.

## 2022-01-04 NOTE — PLAN OF CARE
A&O. Tele: NSR. CIWA: 0. Taken off HFNC. On RA @ 94%. PRN Atarax given 0152 and PRN Dilaudid given at 0443 for back pain.

## 2022-01-04 NOTE — PROGRESS NOTES
Found pt sitting up in bed with HFNC on forehead and oxygen saturations 93-94% ( room air). Pt then removed HFNC and maintains sats .  Pt was placed on 4lpm NC for desaturations with activity . Will wean as tolerated.

## 2022-01-04 NOTE — DISCHARGE SUMMARY
[facility]  Discharge Summary        Greg Sage MRN# [id number]   YOB: 1985 Age: 36 year old     Date of Admission:  1/1/2022  Date of Discharge:  1/5/2022  Admitting Physician:  Michael Sheikh MD  Discharge Physician: Ruth Goodrich MD  Discharging Service: Hospitalist     Primary Provider: Candy Julian  Primary Care Physician Phone Number: None         Discharge Diagnoses/Problem Oriented Hospital Course (Providers):    Greg Sage was admitted on 1/1/2022 by Michael Sheikh MD and I would refer you to their history and physical.  The following problems were addressed during his hospitalization:      Summary of Stay: Greg Sage is a 36 year old male who was admitted on 1/1/2022  36 year old male who is not vaccinated against Covid but denies any known medical problems who presents with severe shortness of breath, tachycardia and left-sided chest pain in the setting of approximately 9 days of symptoms of COVID-19.  He reports that he tested +9 days ago and continued to have cough, body aches and shortness of breath.     Problem List:   1. Severe hypoxemic respiratory failure due to COVID-19 pneumonia, ruled out PE  Superimposed secondary bacterial pneumonia;  Patient was needing BiPAP on admission in the ICU  Started on dexamethasone and remdesivir protocols  Started on ceftriaxone and Zithromax for antibiotics for secondary bacterial pneumonia  Will discharge with oral ceftin and doxycycline on discharge   Currently patient currently needing 2 L of oxygen by nasal cannula  On subcu Lovenox to help with the DVT prophylaxis with COVID-19 infection  Respiratory status much improved shortness of breath improved and is resting comfortably in bed  Wants to go home  We will discharge him with 2 L of oxygen by nasal cannula  We will discharge him with Eliquis 2.5 mg p.o. twice daily for total  1 month of anticoagulation with COVID-19  infection    2. History of vaping although denies during illness however wife reports heavy use cannot rule out vaping injury  3. clincally much improved with treatment   4. Profound elevated D-dimer initial concern for PE, CT chest PE protocol was negative for pulmonary embolism likely from severe Covid pneumonia  5. Sepsis secondary to severe Covid pneumonia  6. Heavy alcohol use, started ETOH protocol, apparently has been treating low back pain with heavy alcohol use almost every night more than 16 ounces.  We will try gabapentin for both alcohol withdrawal, he is shaky and restless but also can help with his back pain.  We will also consult CD.    Patient improved from alcohol withdrawal  Seen by CHEM Depp over the phone.  Given resources and outpatient follow-up appointment information given    CODE STATUS; full code   DVT prophylaxis; subcu Lovenox  Discharge;  Home toay in stable condition with family     Ruth Goodrich MD  Pager  [phone](7AM-6PM)             Code Status:      Full Code        Brief Hospital Stay Summary Sent Home With Patient in AVS:        Reason for your hospital stay      COVID 19 Pneumonia                 Important Results:      See below         Pending Results:        Unresulted Labs Ordered in the Past 30 Days of this Admission     Date and Time Order Name Status Description    1/1/2022 11:09 PM Blood Culture Peripheral Blood Preliminary     1/1/2022 11:09 PM Blood Culture Peripheral Blood Preliminary             Discharge Instructions and Follow-Up:      Follow-up Appointments     Follow-up and recommended labs and tests       F/u with PCP in 1week. Recheck CBC, CMP, CRP in 1week               Discharge Disposition:      Discharged to home        Discharge Medications:        Current Discharge Medication List      START taking these medications    Details   albuterol (PROAIR HFA/PROVENTIL HFA/VENTOLIN HFA) 108 (90 Base) MCG/ACT inhaler Inhale 2 puffs into the lungs every 6  "hours  Qty: 18 g, Refills: 0    Comments: Pharmacy may dispense brand covered by insurance (Proair, or proventil or ventolin or generic albuterol inhaler)  Associated Diagnoses: Pneumonia due to 2019 novel coronavirus      apixaban ANTICOAGULANT (ELIQUIS ANTICOAGULANT) 2.5 MG tablet Take 1 tablet (2.5 mg) by mouth 2 times daily for 27 days  Qty: 54 tablet, Refills: 0    Associated Diagnoses: Pneumonia due to 2019 novel coronavirus      cefuroxime (CEFTIN) 500 MG tablet Take 1 tablet (500 mg) by mouth 2 times daily for 5 days  Qty: 10 tablet, Refills: 0    Associated Diagnoses: Pneumonia due to 2019 novel coronavirus      dexamethasone (DECADRON) 6 MG tablet Take 1 tablet (6 mg) by mouth daily for 7 days  Qty: 7 tablet, Refills: 0    Associated Diagnoses: Pneumonia due to 2019 novel coronavirus         CONTINUE these medications which have NOT CHANGED    Details   allopurinol (ZYLOPRIM) 100 MG tablet Take 100 mg by mouth daily      amphetamine-dextroamphetamine (ADDERALL XR) 30 MG 24 hr capsule Take 30 mg by mouth daily       nicotine (NICODERM CQ) 14 MG/24HR 24 hr patch Place 1 patch onto the skin every 24 hours               Allergies:         Allergies   Allergen Reactions     Bactrim [Sulfamethoxazole W/Trimethoprim]            Consultations This Hospital Stay:      Consultation during this admission received from intensivist, Chem dep         Condition and Physical on Discharge:      Discharge condition: Stable   Vitals: Blood pressure 125/87, pulse (!) 123, temperature 98.4  F (36.9  C), temperature source Oral, resp. rate 18, height 1.854 m (6' 1\"), weight 97.1 kg (214 lb 1.6 oz), SpO2 (!) 87 %.     Constitutional: Alert, awake, NAD    Lungs: Bilateral crackles, no wheezing    Cardiovascular: RRR   Abdomen: Soft, NT, ND, BS+   Skin: Warm and dry    Other:          Discharge Time:      Greater than 30 minutes.        Image Results From This Hospital Stay (For Non-EPIC Providers):        Results for orders " placed or performed during the hospital encounter of 01/01/22   XR Chest Port 1 View    Narrative    EXAM: XR CHEST PORT 1 VIEW  LOCATION: [facility]  DATE/TIME: 1/1/2022 11:07 PM    INDICATION: shortness of breath  COMPARISON: None.      Impression    IMPRESSION: Extensive patchy airspace infiltrates both lower lobes most consistent with pneumonia and very consistent with COVID. Heart size normal.   US Lower Extremity Venous Duplex Bilateral    Narrative    EXAM: US LOWER EXTREMITY VENOUS DUPLEX BILATERAL  LOCATION: [facility]  DATE/TIME: 1/2/2022 12:29 AM    INDICATION: Pain and swelling.  COMPARISON: None.  TECHNIQUE: Venous Duplex ultrasound of bilateral lower extremities with and without compression, augmentation and duplex. Color flow and spectral Doppler with waveform analysis performed.    FINDINGS: Exam includes the common femoral, femoral, popliteal veins as well as segmentally visualized deep calf veins and greater saphenous vein.     RIGHT: No deep vein thrombosis. No superficial thrombophlebitis. No popliteal cyst.    LEFT: No deep vein thrombosis. No superficial thrombophlebitis. No popliteal cyst.      Impression    IMPRESSION:  1.  No deep venous thrombosis in the bilateral lower extremities.   CT Chest Pulmonary Embolism w Contrast    Narrative    EXAM: CT CHEST PULMONARY EMBOLISM W CONTRAST  LOCATION: [facility]  DATE/TIME: 1/2/2022 4:56 PM    INDICATION: PE suspected, high prob. Severe shortness of breath and tachycardia.  COMPARISON: Chest x-ray 01/01/2022.  TECHNIQUE: CT chest pulmonary angiogram during arterial phase injection of IV contrast. Multiplanar reformats and MIP reconstructions were performed. Dose reduction techniques were used.   CONTRAST: 71 mL [id number]    FINDINGS:  ANGIOGRAM CHEST: Pulmonary arteries are normal caliber and negative for pulmonary emboli. Thoracic aorta is negative for dissection.    LUNGS AND  PLEURA: Severe dense multifocal bilateral pulmonary consolidation involving all lobes of both lungs but dominantly seen at the lung bases. No effusions.    MEDIASTINUM/AXILLAE: Multiple enlarged lymph nodes at the mediastinum and bilateral hilar locations. Subcarinal lymph node is 1.4 cm series 7 image 151. There are other examples. No additional acute mediastinal abnormality.    CORONARY ARTERY CALCIFICATION: None.    UPPER ABDOMEN: Diffuse hepatic steatosis. No acute upper abdominal abnormality.    MUSCULOSKELETAL: No acute abnormality.      Impression    IMPRESSION:  1.  Severe dense bilateral consolidation consistent with severe pneumonia.  2.  No evidence for pulmonary embolism.  3.  Multiple enlarged chest lymph nodes.   Echocardiogram Complete     Value    LVEF  60-65%    Narrative    [id number]  [id number]  [id number]  [id number]^BRANDON^BRIGIDO^ROYCE     [facility]  Echocardiography Laboratory  [address] [place] [address]     Name: JAEL LAWSON  MRN: [id number]  : 1985  Study Date: 2022 09:55 AM  Age: 36 yrs  Gender: Male  Patient Location: [facility]  Reason For Study: Pulmonary Emboli  Ordering Physician: BRIGIDO TAYLOR  Performed By: Candy Baltazar     BSA: 2.2 m2  Height: 73 in  Weight: 220 lb  HR: 106  BP: 131/87 mmHg  ______________________________________________________________________________  Procedure  Complete Portable Echo Adult.  ______________________________________________________________________________  Interpretation Summary     Left ventricular size, global systolic function, and wall motion are normal,  estimated LVEF 60-65%.  Right ventricle is not well visualized, however it appears borderline  enlarged, global systolic function is probably normal.  No significant valvular abnormalities.  The inferior vena cava was normal in size with preserved respiratory  variability.     There are no prior studies available for comparison.      ______________________________________________________________________________  Left Ventricle  The left ventricle is normal in size. There is normal left ventricular wall  thickness. Left ventricular systolic function is normal. The visual ejection  fraction is 60-65%. Left ventricular diastolic function is normal. No regional  wall motion abnormalities noted.     Right Ventricle  The right ventricle is not well visualized. Right ventricle is not well  visualized, however it appears borderline enlarged, global systolic function  is probably normal.     Atria  Normal left atrial size. Right atrial size is normal.     Mitral Valve  The mitral valve is normal in structure and function.     Tricuspid Valve  The tricuspid valve is not well visualized, but is grossly normal. Right  ventricular systolic pressure could not be approximated due to inadequate  tricuspid regurgitation.     Aortic Valve  There is mild trileaflet aortic sclerosis.     Pulmonic Valve  The pulmonic valve is not well seen, but is grossly normal.     Vessels  The aortic root is normal size. Normal size ascending aorta. The inferior vena  cava was normal in size with preserved respiratory variability.     Pericardium  There is no pericardial effusion.     ______________________________________________________________________________  MMode/2D Measurements & Calculations  IVSd: 1.1 cm  LVIDd: 4.5 cm  LVIDs: 3.2 cm  LVPWd: 0.77 cm  FS: 29.7 %     LV mass(C)d: 144.6 grams  LV mass(C)dI: 64.5 grams/m2  Ao root diam: 3.0 cm  LA dimension: 3.7 cm  LA/Ao: 1.2  LVOT diam: 2.2 cm  LVOT area: 4.0 cm2  LA Volume (BP): 36.4 ml  LA Volume Index (BP): 16.2 ml/m2  RWT: 0.34     Doppler Measurements & Calculations  MV E max aleida: 72.3 cm/sec  MV A max aleida: 83.4 cm/sec  MV E/A: 0.87  MV dec time: 0.10 sec     LV V1 max PG: 3.9 mmHg  LV V1 max: 99.0 cm/sec  LV V1 VTI: 18.9 cm  SV(LVOT): 74.8 ml  SI(LVOT): 33.4 ml/m2  PA acc time: 0.12 sec  E/E' av.6  Lateral  E/e': 5.1  Medial E/e': 6.2     ______________________________________________________________________________  Report approved by: Ronak Mccormick 01/02/2022 12:08 PM                   Most Recent Lab Results In EPIC (For Non-EPIC Providers):    Most Recent 3 CBC's:  Recent Labs   Lab Test 01/04/22  0632 01/03/22  0530 01/01/22  2300   WBC 14.5* 15.1* 15.9*   HGB 13.8 15.1 17.6   MCV 96 95 92    308 335      Most Recent 3 BMP's:  Recent Labs   Lab Test 01/04/22  0632 01/03/22  1558 01/03/22  1232 01/03/22  0801 01/03/22  0530 01/03/22  0031 01/02/22  0707     --   --   --  143  --  141   POTASSIUM 3.9  --   --   --  3.7  --  3.8   CHLORIDE 109  --   --   --  114*  --  107   CO2 24  --   --   --  23  --  25   BUN 12  --   --   --  12  --  7   CR 0.59*  --   --   --  0.55*  --  0.63*   ANIONGAP 6  --   --   --  6  --  9   LUDIN 8.2*  --   --   --  7.7*  --  8.3*   GLC 93 107* 110*   < > 111*   < > 110*    < > = values in this interval not displayed.     Most Recent 3 Troponin's:No lab results found.    Invalid input(s): TROP, TROPONINIES  Most Recent 3 INR's:No lab results found.  Most Recent 2 LFT's:  Recent Labs   Lab Test 01/04/22  0632 01/03/22  0530   AST 34 26   ALT 34 29   ALKPHOS 89 85   BILITOTAL 0.4 0.5     Most Recent Cholesterol Panel:No lab results found.  Most Recent 6 Bacteria Isolates From Any Culture (See EPIC Reports for Culture Details):No lab results found.  Most Recent TSH, T4 and HgbA1c: No lab results found.

## 2022-01-04 NOTE — PROGRESS NOTES
Patient has been assessed for Home Oxygen needs.  Oxygen readings:   *   RA - at rest  Pulse oximetry SPO2 88%  *   RA - during activity/with exercise SPO2 86%  *   O2 at  2 liters/minute (at rest) ...SPO2 92%  *   O2 at  2 liters/minute (during activity/with exercise) ...SPO2 91 %

## 2022-01-04 NOTE — PLAN OF CARE
Patient on NC 4L, O2 sat 96%. C/o back pain, Tylenol given. CIWA 4 and 5, continue scheduled Gabapentin. Continue current POC.

## 2022-01-04 NOTE — CONSULTS
1/4/2022      Spoke with pt via telephone to screen for CD services. Pt reports he would not be interested in attending inpatient CD treatment at this time. Pt reports he would be willing to receive CD resources. Email has been sent to pt with CD resources. Pt is able to call Mental Health and Addiction Services Line: [phone] and make an appt through [employer] if he would like an evaluation after he is discharged.       Home  [facility]  [email or web address].[employer]    Supportive Services   SMART Recovery  https://www.smartrecovery.org    Alcoholics Anonymous  http://www.aa.org  Community Drug & Alcohol Support Resources   Alcoholics Anonymous   24/7 Phone Line: [phone]   https://[place].org/   https://[place].org/   For additional list of online meetings: http://aa-intergroup.org       FERNANDO Mulligan  Phone: [phone]  Email: [email or web address]

## 2022-01-04 NOTE — PROGRESS NOTES
[facility]    Hospitalist Progress Note    Date of Service (when I saw the patient): 01/04/2022    Assessment & Plan   Greg Sage is a 36 year old male who was admitted on 1/1/2022.  Greg Sage was admitted on 1/1/2022 by Michael Sheikh MD and I would refer you to their history and physical.  The following problems were addressed during his hospitalization:        Summary of Stay: Greg Sage is a 36 year old male who was admitted on 1/1/2022  36 year old male who is not vaccinated against Covid but denies any known medical problems who presents with severe shortness of breath, tachycardia and left-sided chest pain in the setting of approximately 9 days of symptoms of COVID-19.  He reports that he tested +9 days ago and continued to have cough, body aches and shortness of breath.     Problem List:   1. Severe hypoxemic respiratory failure due to COVID-19 pneumonia, ruled out PE  Superimposed secondary bacterial pneumonia;  Patient was needing BiPAP on admission in the ICU  Started on dexamethasone and remdesivir protocols  Started on ceftriaxone and Zithromax for antibiotics for secondary bacterial pneumonia  Currently patient currently needing 2 L of oxygen by nasal cannula  On subcu Lovenox to help with the DVT prophylaxis with COVID-19 infection  Respiratory status much improved shortness of breath improved and is resting comfortably in bed  Wants to go home  We will discharge him with 2 L of oxygen by nasal cannula  We will discharge him with Eliquis 2.5 mg p.o. twice daily for 1 month of anticoagulation with COVID-19 infection     2. History of vaping although denies during illness however wife reports heavy use cannot rule out vaping injury if clinically not improving as expected consider pulmonary consultation  3. Profound elevated D-dimer initial concern for PE, CT chest PE protocol was negative for pulmonary embolism likely from severe Covid  pneumonia  4. Sepsis secondary to severe Covid pneumonia  5. Heavy alcohol use, started ETOH protocol, apparently has been treating low back pain with heavy alcohol use almost every night more than 16 ounces.  We will try gabapentin for both alcohol withdrawal, he is shaky and restless but also can help with his back pain.  We will also consult CD.     Patient improved from alcohol withdrawal  Seen by CHEM Malvin over the phone.  Given resources and outpatient follow-up appointment information given     CODE STATUS; full code   DVT prophylaxis; subcu Lovenox    Discharge; when stable in the next 1 to 2 days once hypoxia improves and remdesivir protocol is complete    Ruth Goodrich MD   Page [phone](7AM-6PM)     Discussed with bedside RN patient and his wife today        Interval History   And is resting comfortably in bed.  Denies shortness of breath.  Still hypoxic needing 2 L of oxygen by nasal cannula.  CRP remains elevated at 173.  No other acute issues since yesterday    -Data reviewed today: I reviewed all new labs and imaging results over the last 24 hours. I personally reviewed no images or EKG's today.    Physical Exam   Temp: 98.4  F (36.9  C) Temp src: Oral BP: 125/87 Pulse: (!) 123   Resp: 18 SpO2: (!) 87 % (ambulated in room) O2 Device: None (Room air) Oxygen Delivery: 2 LPM  Vitals:    01/01/22 2359 01/03/22 0000 01/04/22 0600   Weight: 99.8 kg (220 lb) 92 kg (202 lb 13.2 oz) 97.1 kg (214 lb 1.6 oz)     Vital Signs with Ranges  Temp:  [97.4  F (36.3  C)-98.8  F (37.1  C)] 98.4  F (36.9  C)  Pulse:  [] 123  Resp:  [16-27] 18  BP: (119-144)/() 125/87  FiO2 (%):  [50 %] 50 %  SpO2:  [87 %-99 %] 87 %  I/O last 3 completed shifts:  In: 2965 [P.O.:2100; I.V.:865]  Out: 1800 [Urine:1800]    Constitutional: Awake, alert, cooperative, no apparent distress  Respiratory: Crackles heard on auscultation, no wheezing  Cardiovascular: Regular rate and rhythm, normal S1 and S2, and no murmur noted  GI:  Normal bowel sounds, soft, non-distended, non-tender  Skin/Integumen: No rashes, no cyanosis, no edema  Other:     Medications     - MEDICATION INSTRUCTIONS -         sodium chloride 0.9%  1,000 mL Intravenous Once     allopurinol  100 mg Oral Daily     cefTRIAXone  2 g Intravenous Q24H     cloNIDine  0.1 mg Oral Q8H     dexamethasone  6 mg Intravenous Q24H     doxycycline (VIBRAMYCIN) IV  100 mg Intravenous Q12H     enoxaparin ANTICOAGULANT  40 mg Subcutaneous Q12H     folic acid  1 mg Oral Daily     [START ON 1/6/2022] gabapentin  300 mg Oral Q8H     gabapentin  600 mg Oral Q8H     menthol   Transdermal Q8H     multivitamin w/minerals  1 tablet Oral Daily     nicotine  1 patch Transdermal Daily     nicotine   Transdermal Q8H     sodium chloride (PF)  3 mL Intracatheter Q8H     thiamine  100 mg Oral Daily       Data   Recent Labs   Lab 01/04/22  0632 01/03/22  1558 01/03/22  1232 01/03/22  0801 01/03/22  0530 01/03/22  0031 01/02/22  0707 01/01/22  2300   WBC 14.5*  --   --   --  15.1*  --   --  15.9*   HGB 13.8  --   --   --  15.1  --   --  17.6   MCV 96  --   --   --  95  --   --  92     --   --   --  308  --   --  335     --   --   --  143  --  141 135   POTASSIUM 3.9  --   --   --  3.7  --  3.8 3.5   CHLORIDE 109  --   --   --  114*  --  107 101   CO2 24  --   --   --  23  --  25 25   BUN 12  --   --   --  12  --  7 7   CR 0.59*  --   --   --  0.55*  --  0.63* 0.66   ANIONGAP 6  --   --   --  6  --  9 9   LUDIN 8.2*  --   --   --  7.7*  --  8.3* 8.7   GLC 93 107* 110*   < > 111*   < > 110* 132*   ALBUMIN 1.6*  --   --   --  1.6*  --   --  2.0*   PROTTOTAL 6.5*  --   --   --  6.6*  --   --  7.8   BILITOTAL 0.4  --   --   --  0.5  --   --  1.1   ALKPHOS 89  --   --   --  85  --   --  124   ALT 34  --   --   --  29  --   --  49   AST 34  --   --   --  26  --   --  63*    < > = values in this interval not displayed.       No results found for this or any previous visit (from the past 24 hour(s)).

## 2022-01-04 NOTE — PLAN OF CARE
ICU End of Shift Summary.  For vital signs and complete assessments, please see documentation flowsheets.     Pertinent assessments: Disoriented to time. CIWA 6-8, Ativan x1. Cooperative and calm but reported low back pain despite interventions. Tachycardic 100-110. Afebrile. Good fluid PO intake but appetite poor. Urinal to void. PIV SL x2.  Major Shift Events: CIWA initiated. Phos replacement ordered.  Plan (Upcoming Events): Transfer to C bed. Administer phos replacement and recheck phos per orders. Monitor CIWA. Continue with Remdesevir and Decadron.     Shift Report Completed : report provided to MS3 RN. Pt and belongings transferred to [facility].

## 2022-01-05 ENCOUNTER — DOCUMENTATION ONLY (OUTPATIENT)
Dept: MEDSURG UNIT | Facility: CLINIC | Age: 37
End: 2022-01-05
Payer: COMMERCIAL

## 2022-01-05 VITALS
DIASTOLIC BLOOD PRESSURE: 89 MMHG | WEIGHT: 214.1 LBS | SYSTOLIC BLOOD PRESSURE: 144 MMHG | BODY MASS INDEX: 28.37 KG/M2 | OXYGEN SATURATION: 92 % | RESPIRATION RATE: 20 BRPM | HEIGHT: 73 IN | TEMPERATURE: 98 F | HEART RATE: 97 BPM

## 2022-01-05 LAB
ALBUMIN SERPL-MCNC: 1.6 G/DL (ref 3.4–5)
ALP SERPL-CCNC: 85 U/L (ref 40–150)
ALT SERPL W P-5'-P-CCNC: 41 U/L (ref 0–70)
ANION GAP SERPL CALCULATED.3IONS-SCNC: 4 MMOL/L (ref 3–14)
AST SERPL W P-5'-P-CCNC: 34 U/L (ref 0–45)
BILIRUB SERPL-MCNC: 0.4 MG/DL (ref 0.2–1.3)
BUN SERPL-MCNC: 10 MG/DL (ref 7–30)
CALCIUM SERPL-MCNC: 8.2 MG/DL (ref 8.5–10.1)
CHLORIDE BLD-SCNC: 112 MMOL/L (ref 94–109)
CO2 SERPL-SCNC: 24 MMOL/L (ref 20–32)
CREAT SERPL-MCNC: 0.5 MG/DL (ref 0.66–1.25)
CRP SERPL-MCNC: 110 MG/L (ref 0–8)
CRP SERPL-MCNC: 158 MG/L (ref 0–8)
D DIMER PPP FEU-MCNC: 4.31 UG/ML FEU (ref 0–0.5)
ERYTHROCYTE [DISTWIDTH] IN BLOOD BY AUTOMATED COUNT: 12.8 % (ref 10–15)
GFR SERPL CREATININE-BSD FRML MDRD: >90 ML/MIN/1.73M2
GLUCOSE BLD-MCNC: 89 MG/DL (ref 70–99)
HCT VFR BLD AUTO: 42.1 % (ref 40–53)
HGB BLD-MCNC: 13.9 G/DL (ref 13.3–17.7)
MCH RBC QN AUTO: 31.4 PG (ref 26.5–33)
MCHC RBC AUTO-ENTMCNC: 33 G/DL (ref 31.5–36.5)
MCV RBC AUTO: 95 FL (ref 78–100)
PHOSPHATE SERPL-MCNC: 3.2 MG/DL (ref 2.5–4.5)
PLATELET # BLD AUTO: 224 10E3/UL (ref 150–450)
POTASSIUM BLD-SCNC: 4.3 MMOL/L (ref 3.4–5.3)
PROT SERPL-MCNC: 6.6 G/DL (ref 6.8–8.8)
RBC # BLD AUTO: 4.43 10E6/UL (ref 4.4–5.9)
SODIUM SERPL-SCNC: 140 MMOL/L (ref 133–144)
WBC # BLD AUTO: 12.1 10E3/UL (ref 4–11)

## 2022-01-05 PROCEDURE — 36415 COLL VENOUS BLD VENIPUNCTURE: CPT | Performed by: INTERNAL MEDICINE

## 2022-01-05 PROCEDURE — 250N000013 HC RX MED GY IP 250 OP 250 PS 637: Performed by: INTERNAL MEDICINE

## 2022-01-05 PROCEDURE — 85379 FIBRIN DEGRADATION QUANT: CPT | Performed by: INTERNAL MEDICINE

## 2022-01-05 PROCEDURE — 258N000003 HC RX IP 258 OP 636: Performed by: INTERNAL MEDICINE

## 2022-01-05 PROCEDURE — 250N000011 HC RX IP 250 OP 636: Performed by: INTERNAL MEDICINE

## 2022-01-05 PROCEDURE — 80053 COMPREHEN METABOLIC PANEL: CPT | Performed by: INTERNAL MEDICINE

## 2022-01-05 PROCEDURE — 85027 COMPLETE CBC AUTOMATED: CPT | Performed by: INTERNAL MEDICINE

## 2022-01-05 PROCEDURE — 84100 ASSAY OF PHOSPHORUS: CPT | Performed by: INTERNAL MEDICINE

## 2022-01-05 PROCEDURE — 86140 C-REACTIVE PROTEIN: CPT | Performed by: INTERNAL MEDICINE

## 2022-01-05 PROCEDURE — 250N000009 HC RX 250: Performed by: INTERNAL MEDICINE

## 2022-01-05 PROCEDURE — 99239 HOSP IP/OBS DSCHRG MGMT >30: CPT | Performed by: INTERNAL MEDICINE

## 2022-01-05 RX ORDER — NICOTINE 21 MG/24HR
1 PATCH, TRANSDERMAL 24 HOURS TRANSDERMAL EVERY 24 HOURS
Qty: 30 PATCH | Refills: 0 | Status: SHIPPED | OUTPATIENT
Start: 2022-01-05

## 2022-01-05 RX ADMIN — ENOXAPARIN SODIUM 40 MG: 40 INJECTION SUBCUTANEOUS at 11:24

## 2022-01-05 RX ADMIN — CLONIDINE HYDROCHLORIDE 0.1 MG: 0.1 TABLET ORAL at 06:00

## 2022-01-05 RX ADMIN — ACETAMINOPHEN 650 MG: 325 TABLET, FILM COATED ORAL at 01:30

## 2022-01-05 RX ADMIN — DEXAMETHASONE SODIUM PHOSPHATE 10 MG: 10 INJECTION INTRAMUSCULAR; INTRAVENOUS at 07:57

## 2022-01-05 RX ADMIN — GABAPENTIN 600 MG: 300 CAPSULE ORAL at 06:00

## 2022-01-05 RX ADMIN — FOLIC ACID 1 MG: 1 TABLET ORAL at 07:57

## 2022-01-05 RX ADMIN — SODIUM CHLORIDE 50 ML: 9 INJECTION, SOLUTION INTRAVENOUS at 13:12

## 2022-01-05 RX ADMIN — NICOTINE 1 PATCH: 21 PATCH, EXTENDED RELEASE TRANSDERMAL at 07:58

## 2022-01-05 RX ADMIN — MULTIPLE VITAMINS W/ MINERALS TAB 1 TABLET: TAB at 08:08

## 2022-01-05 RX ADMIN — REMDESIVIR 100 MG: 100 INJECTION, POWDER, LYOPHILIZED, FOR SOLUTION INTRAVENOUS at 11:22

## 2022-01-05 RX ADMIN — ENOXAPARIN SODIUM 40 MG: 40 INJECTION SUBCUTANEOUS at 01:30

## 2022-01-05 RX ADMIN — THIAMINE HCL TAB 100 MG 100 MG: 100 TAB at 07:59

## 2022-01-05 RX ADMIN — ALLOPURINOL 100 MG: 100 TABLET ORAL at 07:56

## 2022-01-05 ASSESSMENT — ACTIVITIES OF DAILY LIVING (ADL)
ADLS_ACUITY_SCORE: 3

## 2022-01-05 NOTE — PROGRESS NOTES
Received intake call for home oxygen at 12:54PM. Reviewed patient's chart; Patient qualifies under insurance guidelines and all documentation is in the chart including a good order.   1:07PM- Called to offer choice and patient is okay with [facility] setting them up. Discussed equipment with patient and informed them that we would be to bedside with oxygen in the next 2 hours.

## 2022-01-05 NOTE — PLAN OF CARE
Pt a/o x4. Up independent in room. Pt has no  c/o of pain. Pt has scabbed over wound on bridge of nose from BIPAP and patient said he picked a pimple. Pt LS fine crackles in bases left greater than right. Pt sats 2L NC 93-91%. Pt de-sats  decrease when talking down to the 91%. Pt had BM this am and states he has no difficulty with voiding. Cont with plan of care.     15:30. Pt discharge orders reviewed with patient. Pt understood activity is  bedrest with ability to go to BR as needed until his oxygen needs decrease.  Patient given and demonstrated pulse oximetry equipment to bring home. Explained his goal is to keep his oxygen saturations 90 and above. Instructed patient on home 02 and concentrator. Reviewed discharge medication and when to take each medication next. All questions answered and all belongings with patient. Explained to patient if he starts to feel worse and cannot keep his oxygen 90% or above to return to hospital ER. Pt verbalized understanding.

## 2022-01-05 NOTE — PROGRESS NOTES
Patient has been assessed for Home Oxygen needs. Oxygen readings:    *Pulse oximetry (SpO2) = 91% on room air at rest while awake.    *SpO2 improved to 91% on  0 liters/minute at rest.    *SpO2 = 85% on room air during activity/with exercise.    *SpO2 improved to 92% on 4 liters/minute during activity/with exercise.

## 2022-01-05 NOTE — PLAN OF CARE
VSS with elevated BP. Notified provider of diastolic BP of 102. Gave scheduled Clonidine. A/Ox4. LS dim, had to increase from RA to 2L NC due to sating at 88%. Now sating at 91% or higher. Pt denies SOB. Gave PRN Tylenol for back pain x1. Phos protocols, recheck in AM. CIWA scores 3 and 2 for tremor. Ind in room. Possible discharge today or tomorrow.

## 2022-01-05 NOTE — DISCHARGE INSTRUCTIONS
Oxygen Provider:  Arranged through [facility], contact number [phone].  If you have any questions or concerns please call the oxygen company directly.

## 2022-01-05 NOTE — PLAN OF CARE
"Blood pressure 128/88, pulse 76, temperature 98.2  F (36.8  C), temperature source Oral, resp. rate 20, height 1.854 m (6' 1\"), weight 97.1 kg (214 lb 1.6 oz), SpO2 93 %.    VSS.  Patient weaned to room air and tolerating with sats in mid 90's. Continue IV antibiotics, independent in the room.  Phos recheck was 3.5.  Recheck scheduled for the morning.  CIWA score of zero.  Possible discharge tomorrow.  Will continue plan of care.      "

## 2022-01-07 LAB
BACTERIA BLD CULT: NO GROWTH
BACTERIA BLD CULT: NO GROWTH

## 2022-01-16 ENCOUNTER — APPOINTMENT (OUTPATIENT)
Dept: CT IMAGING | Facility: CLINIC | Age: 37
End: 2022-01-16
Attending: EMERGENCY MEDICINE
Payer: COMMERCIAL

## 2022-01-16 ENCOUNTER — HOSPITAL ENCOUNTER (OUTPATIENT)
Facility: CLINIC | Age: 37
Setting detail: OBSERVATION
Discharge: HOME | End: 2022-01-17
Attending: EMERGENCY MEDICINE | Admitting: INTERNAL MEDICINE
Payer: COMMERCIAL

## 2022-01-16 DIAGNOSIS — J18.9 PNEUMONIA OF RIGHT LOWER LOBE DUE TO INFECTIOUS ORGANISM: ICD-10-CM

## 2022-01-16 DIAGNOSIS — A41.9 SEPSIS WITHOUT ACUTE ORGAN DYSFUNCTION, DUE TO UNSPECIFIED ORGANISM (H): ICD-10-CM

## 2022-01-16 DIAGNOSIS — J90 PLEURAL EFFUSION ON RIGHT: ICD-10-CM

## 2022-01-16 DIAGNOSIS — U07.1 PNEUMONIA DUE TO 2019 NOVEL CORONAVIRUS: Primary | ICD-10-CM

## 2022-01-16 DIAGNOSIS — J12.82 PNEUMONIA DUE TO 2019 NOVEL CORONAVIRUS: Primary | ICD-10-CM

## 2022-01-16 LAB
ALBUMIN SERPL-MCNC: 2.5 G/DL (ref 3.4–5)
ALP SERPL-CCNC: 112 U/L (ref 40–150)
ALT SERPL W P-5'-P-CCNC: 159 U/L (ref 0–70)
ANION GAP SERPL CALCULATED.3IONS-SCNC: 7 MMOL/L (ref 3–14)
AST SERPL W P-5'-P-CCNC: 49 U/L (ref 0–45)
BASOPHILS # BLD AUTO: 0.1 10E3/UL (ref 0–0.2)
BASOPHILS NFR BLD AUTO: 1 %
BILIRUB SERPL-MCNC: 0.6 MG/DL (ref 0.2–1.3)
BUN SERPL-MCNC: 12 MG/DL (ref 7–30)
CALCIUM SERPL-MCNC: 8.8 MG/DL (ref 8.5–10.1)
CHLORIDE BLD-SCNC: 105 MMOL/L (ref 94–109)
CK SERPL-CCNC: 52 U/L (ref 30–300)
CO2 SERPL-SCNC: 25 MMOL/L (ref 20–32)
CREAT SERPL-MCNC: 0.64 MG/DL (ref 0.66–1.25)
CRP SERPL-MCNC: 109 MG/L (ref 0–8)
D DIMER PPP FEU-MCNC: 3.3 UG/ML FEU (ref 0–0.5)
EOSINOPHIL # BLD AUTO: 1.1 10E3/UL (ref 0–0.7)
EOSINOPHIL NFR BLD AUTO: 8 %
ERYTHROCYTE [DISTWIDTH] IN BLOOD BY AUTOMATED COUNT: 13.2 % (ref 10–15)
GFR SERPL CREATININE-BSD FRML MDRD: >90 ML/MIN/1.73M2
GLUCOSE BLD-MCNC: 107 MG/DL (ref 70–99)
HCT VFR BLD AUTO: 44.1 % (ref 40–53)
HGB BLD-MCNC: 14.8 G/DL (ref 13.3–17.7)
HOLD SPECIMEN: NORMAL
IMM GRANULOCYTES # BLD: 0.2 10E3/UL
IMM GRANULOCYTES NFR BLD: 2 %
LACTATE SERPL-SCNC: 1.1 MMOL/L (ref 0.7–2)
LIPASE SERPL-CCNC: 135 U/L (ref 73–393)
LYMPHOCYTES # BLD AUTO: 1.9 10E3/UL (ref 0.8–5.3)
LYMPHOCYTES NFR BLD AUTO: 14 %
MCH RBC QN AUTO: 31.6 PG (ref 26.5–33)
MCHC RBC AUTO-ENTMCNC: 33.6 G/DL (ref 31.5–36.5)
MCV RBC AUTO: 94 FL (ref 78–100)
MONOCYTES # BLD AUTO: 1.4 10E3/UL (ref 0–1.3)
MONOCYTES NFR BLD AUTO: 11 %
NEUTROPHILS # BLD AUTO: 8.6 10E3/UL (ref 1.6–8.3)
NEUTROPHILS NFR BLD AUTO: 64 %
NRBC # BLD AUTO: 0 10E3/UL
NRBC BLD AUTO-RTO: 0 /100
PLATELET # BLD AUTO: 375 10E3/UL (ref 150–450)
POTASSIUM BLD-SCNC: 4.1 MMOL/L (ref 3.4–5.3)
PROCALCITONIN SERPL-MCNC: <0.05 NG/ML
PROT SERPL-MCNC: 7.1 G/DL (ref 6.8–8.8)
RBC # BLD AUTO: 4.69 10E6/UL (ref 4.4–5.9)
SODIUM SERPL-SCNC: 137 MMOL/L (ref 133–144)
WBC # BLD AUTO: 13.3 10E3/UL (ref 4–11)

## 2022-01-16 PROCEDURE — 82550 ASSAY OF CK (CPK): CPT | Performed by: INTERNAL MEDICINE

## 2022-01-16 PROCEDURE — 83605 ASSAY OF LACTIC ACID: CPT | Performed by: EMERGENCY MEDICINE

## 2022-01-16 PROCEDURE — 96367 TX/PROPH/DG ADDL SEQ IV INF: CPT

## 2022-01-16 PROCEDURE — 250N000013 HC RX MED GY IP 250 OP 250 PS 637: Performed by: INTERNAL MEDICINE

## 2022-01-16 PROCEDURE — 80053 COMPREHEN METABOLIC PANEL: CPT | Performed by: EMERGENCY MEDICINE

## 2022-01-16 PROCEDURE — 86140 C-REACTIVE PROTEIN: CPT | Performed by: INTERNAL MEDICINE

## 2022-01-16 PROCEDURE — 258N000003 HC RX IP 258 OP 636: Performed by: EMERGENCY MEDICINE

## 2022-01-16 PROCEDURE — G0378 HOSPITAL OBSERVATION PER HR: HCPCS

## 2022-01-16 PROCEDURE — 250N000011 HC RX IP 250 OP 636: Performed by: EMERGENCY MEDICINE

## 2022-01-16 PROCEDURE — 74177 CT ABD & PELVIS W/CONTRAST: CPT

## 2022-01-16 PROCEDURE — 99220 PR INITIAL OBSERVATION CARE,LEVEL III: CPT | Performed by: INTERNAL MEDICINE

## 2022-01-16 PROCEDURE — 71275 CT ANGIOGRAPHY CHEST: CPT

## 2022-01-16 PROCEDURE — 85379 FIBRIN DEGRADATION QUANT: CPT | Performed by: EMERGENCY MEDICINE

## 2022-01-16 PROCEDURE — 84145 PROCALCITONIN (PCT): CPT | Performed by: INTERNAL MEDICINE

## 2022-01-16 PROCEDURE — 96365 THER/PROPH/DIAG IV INF INIT: CPT | Mod: 59

## 2022-01-16 PROCEDURE — 36415 COLL VENOUS BLD VENIPUNCTURE: CPT | Performed by: EMERGENCY MEDICINE

## 2022-01-16 PROCEDURE — 96375 TX/PRO/DX INJ NEW DRUG ADDON: CPT

## 2022-01-16 PROCEDURE — 96376 TX/PRO/DX INJ SAME DRUG ADON: CPT

## 2022-01-16 PROCEDURE — 83690 ASSAY OF LIPASE: CPT | Performed by: EMERGENCY MEDICINE

## 2022-01-16 PROCEDURE — 36415 COLL VENOUS BLD VENIPUNCTURE: CPT | Performed by: INTERNAL MEDICINE

## 2022-01-16 PROCEDURE — 96366 THER/PROPH/DIAG IV INF ADDON: CPT

## 2022-01-16 PROCEDURE — 250N000009 HC RX 250: Performed by: EMERGENCY MEDICINE

## 2022-01-16 PROCEDURE — 96361 HYDRATE IV INFUSION ADD-ON: CPT

## 2022-01-16 PROCEDURE — 99285 EMERGENCY DEPT VISIT HI MDM: CPT | Mod: 25

## 2022-01-16 PROCEDURE — 250N000011 HC RX IP 250 OP 636: Performed by: INTERNAL MEDICINE

## 2022-01-16 PROCEDURE — 85025 COMPLETE CBC W/AUTO DIFF WBC: CPT | Performed by: EMERGENCY MEDICINE

## 2022-01-16 RX ORDER — MULTIVITAMIN,THERAPEUTIC
1 TABLET ORAL DAILY
Status: DISCONTINUED | OUTPATIENT
Start: 2022-01-16 | End: 2022-01-17 | Stop reason: HOSPADM

## 2022-01-16 RX ORDER — DEXTROAMPHETAMINE SACCHARATE, AMPHETAMINE ASPARTATE MONOHYDRATE, DEXTROAMPHETAMINE SULFATE AND AMPHETAMINE SULFATE 1.25; 1.25; 1.25; 1.25 MG/1; MG/1; MG/1; MG/1
10 CAPSULE, EXTENDED RELEASE ORAL DAILY
Status: DISCONTINUED | OUTPATIENT
Start: 2022-01-17 | End: 2022-01-17 | Stop reason: HOSPADM

## 2022-01-16 RX ORDER — IBUPROFEN 200 MG
200 TABLET ORAL EVERY 6 HOURS PRN
Status: DISCONTINUED | OUTPATIENT
Start: 2022-01-16 | End: 2022-01-16

## 2022-01-16 RX ORDER — DEXTROAMPHETAMINE SACCHARATE, AMPHETAMINE ASPARTATE MONOHYDRATE, DEXTROAMPHETAMINE SULFATE AND AMPHETAMINE SULFATE 3.75; 3.75; 3.75; 3.75 MG/1; MG/1; MG/1; MG/1
30 CAPSULE, EXTENDED RELEASE ORAL DAILY
Status: DISCONTINUED | OUTPATIENT
Start: 2022-01-16 | End: 2022-01-16

## 2022-01-16 RX ORDER — ONDANSETRON 4 MG/1
4 TABLET, ORALLY DISINTEGRATING ORAL EVERY 6 HOURS PRN
Status: DISCONTINUED | OUTPATIENT
Start: 2022-01-16 | End: 2022-01-17 | Stop reason: HOSPADM

## 2022-01-16 RX ORDER — NICOTINE 21 MG/24HR
1 PATCH, TRANSDERMAL 24 HOURS TRANSDERMAL EVERY 24 HOURS
Status: DISCONTINUED | OUTPATIENT
Start: 2022-01-16 | End: 2022-01-17 | Stop reason: HOSPADM

## 2022-01-16 RX ORDER — PROCHLORPERAZINE MALEATE 5 MG
10 TABLET ORAL EVERY 6 HOURS PRN
Status: DISCONTINUED | OUTPATIENT
Start: 2022-01-16 | End: 2022-01-17 | Stop reason: HOSPADM

## 2022-01-16 RX ORDER — PIPERACILLIN SODIUM, TAZOBACTAM SODIUM 4; .5 G/20ML; G/20ML
4.5 INJECTION, POWDER, LYOPHILIZED, FOR SOLUTION INTRAVENOUS EVERY 6 HOURS
Status: DISCONTINUED | OUTPATIENT
Start: 2022-01-16 | End: 2022-01-17 | Stop reason: HOSPADM

## 2022-01-16 RX ORDER — AZITHROMYCIN 250 MG/1
250 TABLET, FILM COATED ORAL DAILY
Status: DISCONTINUED | OUTPATIENT
Start: 2022-01-17 | End: 2022-01-17 | Stop reason: HOSPADM

## 2022-01-16 RX ORDER — OXYCODONE HYDROCHLORIDE 5 MG/1
5 TABLET ORAL EVERY 4 HOURS PRN
Status: DISCONTINUED | OUTPATIENT
Start: 2022-01-16 | End: 2022-01-17 | Stop reason: HOSPADM

## 2022-01-16 RX ORDER — PROCHLORPERAZINE 25 MG
25 SUPPOSITORY, RECTAL RECTAL EVERY 12 HOURS PRN
Status: DISCONTINUED | OUTPATIENT
Start: 2022-01-16 | End: 2022-01-17 | Stop reason: HOSPADM

## 2022-01-16 RX ORDER — ACETAMINOPHEN 325 MG/1
650 TABLET ORAL EVERY 4 HOURS PRN
Status: DISCONTINUED | OUTPATIENT
Start: 2022-01-16 | End: 2022-01-17 | Stop reason: HOSPADM

## 2022-01-16 RX ORDER — HYDROXYZINE HYDROCHLORIDE 25 MG/1
25 TABLET, FILM COATED ORAL EVERY 6 HOURS PRN
Status: DISCONTINUED | OUTPATIENT
Start: 2022-01-16 | End: 2022-01-17 | Stop reason: HOSPADM

## 2022-01-16 RX ORDER — HYDROMORPHONE HYDROCHLORIDE 1 MG/ML
0.5 INJECTION, SOLUTION INTRAMUSCULAR; INTRAVENOUS; SUBCUTANEOUS
Status: COMPLETED | OUTPATIENT
Start: 2022-01-16 | End: 2022-01-16

## 2022-01-16 RX ORDER — ALBUTEROL SULFATE 90 UG/1
2 AEROSOL, METERED RESPIRATORY (INHALATION) EVERY 6 HOURS PRN
Status: DISCONTINUED | OUTPATIENT
Start: 2022-01-16 | End: 2022-01-17 | Stop reason: HOSPADM

## 2022-01-16 RX ORDER — AMOXICILLIN 250 MG
2 CAPSULE ORAL 2 TIMES DAILY PRN
Status: DISCONTINUED | OUTPATIENT
Start: 2022-01-16 | End: 2022-01-17 | Stop reason: HOSPADM

## 2022-01-16 RX ORDER — AMOXICILLIN 250 MG
1 CAPSULE ORAL 2 TIMES DAILY PRN
Status: DISCONTINUED | OUTPATIENT
Start: 2022-01-16 | End: 2022-01-17 | Stop reason: HOSPADM

## 2022-01-16 RX ORDER — ALLOPURINOL 100 MG/1
100 TABLET ORAL DAILY
Status: DISCONTINUED | OUTPATIENT
Start: 2022-01-16 | End: 2022-01-17 | Stop reason: HOSPADM

## 2022-01-16 RX ORDER — DEXTROAMPHETAMINE SACCHARATE, AMPHETAMINE ASPARTATE MONOHYDRATE, DEXTROAMPHETAMINE SULFATE AND AMPHETAMINE SULFATE 5; 5; 5; 5 MG/1; MG/1; MG/1; MG/1
20 CAPSULE, EXTENDED RELEASE ORAL DAILY
Status: DISCONTINUED | OUTPATIENT
Start: 2022-01-17 | End: 2022-01-17 | Stop reason: HOSPADM

## 2022-01-16 RX ORDER — IOPAMIDOL 755 MG/ML
75 INJECTION, SOLUTION INTRAVASCULAR ONCE
Status: COMPLETED | OUTPATIENT
Start: 2022-01-16 | End: 2022-01-16

## 2022-01-16 RX ORDER — IOPAMIDOL 755 MG/ML
500 INJECTION, SOLUTION INTRAVASCULAR ONCE
Status: COMPLETED | OUTPATIENT
Start: 2022-01-16 | End: 2022-01-16

## 2022-01-16 RX ORDER — AZITHROMYCIN 250 MG/1
500 TABLET, FILM COATED ORAL ONCE
Status: COMPLETED | OUTPATIENT
Start: 2022-01-16 | End: 2022-01-16

## 2022-01-16 RX ORDER — ACETAMINOPHEN 650 MG/1
650 SUPPOSITORY RECTAL EVERY 4 HOURS PRN
Status: DISCONTINUED | OUTPATIENT
Start: 2022-01-16 | End: 2022-01-17 | Stop reason: HOSPADM

## 2022-01-16 RX ORDER — ONDANSETRON 2 MG/ML
4 INJECTION INTRAMUSCULAR; INTRAVENOUS EVERY 6 HOURS PRN
Status: DISCONTINUED | OUTPATIENT
Start: 2022-01-16 | End: 2022-01-17 | Stop reason: HOSPADM

## 2022-01-16 RX ORDER — FOLIC ACID 1 MG/1
1 TABLET ORAL DAILY
Status: DISCONTINUED | OUTPATIENT
Start: 2022-01-16 | End: 2022-01-17 | Stop reason: HOSPADM

## 2022-01-16 RX ADMIN — TAZOBACTAM SODIUM AND PIPERACILLIN SODIUM 4.5 G: 500; 4 INJECTION, SOLUTION INTRAVENOUS at 13:32

## 2022-01-16 RX ADMIN — IOPAMIDOL 100 ML: 755 INJECTION, SOLUTION INTRAVENOUS at 06:03

## 2022-01-16 RX ADMIN — PIPERACILLIN AND TAZOBACTAM 4.5 G: 4; .5 INJECTION, POWDER, LYOPHILIZED, FOR SOLUTION INTRAVENOUS at 20:34

## 2022-01-16 RX ADMIN — ACETAMINOPHEN 650 MG: 325 TABLET, FILM COATED ORAL at 12:03

## 2022-01-16 RX ADMIN — IOPAMIDOL 75 ML: 755 INJECTION, SOLUTION INTRAVENOUS at 08:52

## 2022-01-16 RX ADMIN — HYDROXYZINE HYDROCHLORIDE 25 MG: 25 TABLET, FILM COATED ORAL at 12:03

## 2022-01-16 RX ADMIN — FOLIC ACID 1 MG: 1 TABLET ORAL at 13:32

## 2022-01-16 RX ADMIN — APIXABAN 2.5 MG: 2.5 TABLET, FILM COATED ORAL at 13:32

## 2022-01-16 RX ADMIN — HYDROMORPHONE HYDROCHLORIDE 0.5 MG: 1 INJECTION, SOLUTION INTRAMUSCULAR; INTRAVENOUS; SUBCUTANEOUS at 05:36

## 2022-01-16 RX ADMIN — THERA TABS 1 TABLET: TAB at 13:32

## 2022-01-16 RX ADMIN — AZITHROMYCIN MONOHYDRATE 500 MG: 250 TABLET ORAL at 13:32

## 2022-01-16 RX ADMIN — OXYCODONE HYDROCHLORIDE 5 MG: 5 TABLET ORAL at 20:29

## 2022-01-16 RX ADMIN — ALLOPURINOL 100 MG: 100 TABLET ORAL at 13:32

## 2022-01-16 RX ADMIN — SODIUM CHLORIDE, POTASSIUM CHLORIDE, SODIUM LACTATE AND CALCIUM CHLORIDE 1000 ML: 600; 310; 30; 20 INJECTION, SOLUTION INTRAVENOUS at 09:37

## 2022-01-16 RX ADMIN — OXYCODONE HYDROCHLORIDE 5 MG: 5 TABLET ORAL at 16:25

## 2022-01-16 RX ADMIN — SODIUM CHLORIDE 1000 ML: 9 INJECTION, SOLUTION INTRAVENOUS at 04:45

## 2022-01-16 RX ADMIN — HYDROMORPHONE HYDROCHLORIDE 0.5 MG: 1 INJECTION, SOLUTION INTRAMUSCULAR; INTRAVENOUS; SUBCUTANEOUS at 06:50

## 2022-01-16 RX ADMIN — TAZOBACTAM SODIUM AND PIPERACILLIN SODIUM 4.5 G: 500; 4 INJECTION, SOLUTION INTRAVENOUS at 07:09

## 2022-01-16 RX ADMIN — APIXABAN 2.5 MG: 2.5 TABLET, FILM COATED ORAL at 20:29

## 2022-01-16 RX ADMIN — NICOTINE 1 PATCH: 14 PATCH, EXTENDED RELEASE TRANSDERMAL at 13:31

## 2022-01-16 RX ADMIN — HYDROMORPHONE HYDROCHLORIDE 0.5 MG: 1 INJECTION, SOLUTION INTRAMUSCULAR; INTRAVENOUS; SUBCUTANEOUS at 06:16

## 2022-01-16 RX ADMIN — SODIUM CHLORIDE 65 ML: 9 INJECTION, SOLUTION INTRAVENOUS at 06:03

## 2022-01-16 RX ADMIN — THIAMINE HCL TAB 100 MG 100 MG: 100 TAB at 13:32

## 2022-01-16 ASSESSMENT — ACTIVITIES OF DAILY LIVING (ADL)
ADLS_ACUITY_SCORE: 12

## 2022-01-16 ASSESSMENT — MIFFLIN-ST. JEOR
SCORE: 1940.05
SCORE: 1949.58

## 2022-01-16 ASSESSMENT — ENCOUNTER SYMPTOMS
VOMITING: 0
DIARRHEA: 0
FEVER: 0
ARTHRALGIAS: 1
ABDOMINAL PAIN: 1

## 2022-01-16 NOTE — ED TRIAGE NOTES
Her for right upper abdominal pain started about 1.5 week ago. Took Aleeve around 2am. ABCs intact.    Include Location In Plan?: Yes Detail Level: Zone

## 2022-01-16 NOTE — ED PROVIDER NOTES
"  History   Chief Complaint:  Abdominal Pain       The history is provided by the patient.      Greg Sage is a 36 year old male on Eliquis with history of recent Covid-19 pneumonia who presents with abdominal pain. The patient reports began experiencing abdominal pain about a week ago. He states that his pain is intermittent, with 20 to 30 minute episodes of pain occurring \"once or twice a day.\" He notes that his pain was localized to his RUQ for the last week and recently began radiating to his right shoulder. The patient has reportedly been taking 2 tablets of Aleve every 6 hours for his pain. Of note, he reports that he was recently discharged from the hospital for Covid-19 pneumonia about a week ago. At this time, the patient denies vomiting, diarrhea, or fever.    Review of Systems   Constitutional: Negative for fever.   Gastrointestinal: Positive for abdominal pain (RUQ). Negative for diarrhea and vomiting.   Musculoskeletal: Positive for arthralgias (R shoulder).   All other systems reviewed and are negative.      Allergies:  Bactrim [Sulfamethoxazole w/ Trimethoprim]    Medications:  Allopurinol  Adderall  Eliquis  Decadron    Past Medical History:     ADD  Pneumonia due to Covid-19  COLTON    Past Surgical History:    [place] teeth extraction      Family History:    The patient denies past family history.     Social History:  Presents to the emergency department alone  Arrives via car     Physical Exam     Patient Vitals for the past 24 hrs:   BP Temp Temp src Pulse Resp SpO2 Height Weight   01/16/22 0900 123/77 -- -- 108 18 99 % -- --   01/16/22 0700 -- -- -- -- -- 97 % -- --   01/16/22 0652 -- -- -- 120 17 96 % -- --   01/16/22 0445 -- -- -- (!) 124 18 97 % -- --   01/16/22 0401 114/78 97.8  F (36.6  C) Oral (!) 132 18 94 % 1.854 m (6' 1\") 97.1 kg (214 lb)       Physical Exam  Nursing note and vitals reviewed.  HENT:   Mouth/Throat: Moist mucous membranes.   Eyes: EOMI, nonicteric " sclera  Cardiovascular: tachycardic, regular rhythm, no murmurs, rubs, or gallops  Pulmonary/Chest: Effort normal and breath sounds normal. No respiratory distress. No wheezes. No rales.   Abdominal: Soft. Diffuse abdominal pain with guarding.  nondistended   Musculoskeletal: Normal range of motion.   Neurological: Alert. Moves all extremities spontaneously.   Skin: Skin is warm and dry. No rash noted.     Emergency Department Course     Imaging:  Abd/pelvis CT,  IV  contrast only TRAUMA / AAA   Final Result   IMPRESSION:    1.  Mildly improved but persistent, right greater than left, consolidation and reticulations, likely reflecting sequela of COVID pneumonia, correlate to exclude superimposed bacterial pneumonia.   2.  Interval development of small right pleural effusion.   3.  Fatty liver.   4.  No obstruction, colitis, diverticulitis, or appendicitis.           Report per radiology    Laboratory:  Labs Ordered and Resulted from Time of ED Arrival to Time of ED Departure   COMPREHENSIVE METABOLIC PANEL - Abnormal       Result Value    Sodium 137      Potassium 4.1      Chloride 105      Carbon Dioxide (CO2) 25      Anion Gap 7      Urea Nitrogen 12      Creatinine 0.64 (*)     Calcium 8.8      Glucose 107 (*)     Alkaline Phosphatase 112      AST 49 (*)      (*)     Protein Total 7.1      Albumin 2.5 (*)     Bilirubin Total 0.6      GFR Estimate >90     CBC WITH PLATELETS AND DIFFERENTIAL - Abnormal    WBC Count 13.3 (*)     RBC Count 4.69      Hemoglobin 14.8      Hematocrit 44.1      MCV 94      MCH 31.6      MCHC 33.6      RDW 13.2      Platelet Count 375      % Neutrophils 64      % Lymphocytes 14      % Monocytes 11      % Eosinophils 8      % Basophils 1      % Immature Granulocytes 2      NRBCs per 100 WBC 0      Absolute Neutrophils 8.6 (*)     Absolute Lymphocytes 1.9      Absolute Monocytes 1.4 (*)     Absolute Eosinophils 1.1 (*)     Absolute Basophils 0.1      Absolute Immature Granulocytes  0.2      Absolute NRBCs 0.0     LIPASE - Normal    Lipase 135     LACTIC ACID WHOLE BLOOD - Normal    Lactic Acid 1.1     D DIMER QUANTITATIVE        Emergency Department Course:  Reviewed:  I reviewed nursing notes, vitals, past medical history and Care Everywhere    Assessments:  0526 I obtained history and examined the patient as noted above.   0647 I rechecked the patient and explained findings.    Consults:   I spoke with Dr. Bone, hospitalist, who accepts pt for admission.     Interventions:    Medications   lactated ringers BOLUS 1,000 mL (1,000 mLs Intravenous New Bag 1/16/22 0937)   0.9% sodium chloride BOLUS (0 mLs Intravenous Stopped 1/16/22 0605)   HYDROmorphone (PF) (DILAUDID) injection 0.5 mg (0.5 mg Intravenous Given 1/16/22 0650)   iopamidol (ISOVUE-370) solution 500 mL (100 mLs Intravenous Given 1/16/22 0603)   Sodium Chloride 0.9 % Bag 500mL for CT Scan Flush Use (65 mLs Intravenous Given 1/16/22 0603)   piperacillin-tazobactam (ZOSYN) intermittent infusion 4.5 g (0 g Intravenous Stopped 1/16/22 0926)   iopamidol (ISOVUE-370) solution 75 mL (75 mLs Intravenous Given 1/16/22 0852)   sodium chloride (PF) 0.9% PF flush 75 mL (75 mLs Intravenous Given 1/16/22 0857)         Disposition:  The patient was admitted to the hospital under the care of Dr. Bone.     Impression & Plan       Medical Decision Making:  Pt presents with CC right-sided abdominal pain. Pt guarding on exam and given overuse of nsaids, highly suspicious for perforated viscus. CT abdomen without concerning findings, but lower lung fields continue to show pneumonia likely related to recent covid-19, but also now with pleural effusion, likely explanatory for right-sided chest and abdominal pain. Pt with significant tachycardia and leukocytosis suggestive of potential sepsis. Zosyn ordered for coverage. D-dimer also continues to be elevated, therefore repeat CT chest is ordered and pending at time of signout. Discussed with   Smitha from our hospitalist service who accepts patient for admission.  All of patient's questions were answered and they are in agreement with the plan.      Diagnosis:    ICD-10-CM    1. Pleural effusion on right  J90    2. Sepsis without acute organ dysfunction, due to unspecified organism (H)  A41.9    3. Pneumonia of right lower lobe due to infectious organism  J18.9        Scribe Disclosure:  I, Meng Gonzalez, am serving as a scribe at 5:24 AM on 1/16/2022 to document services personally performed by Elias Fernandes MD based on my observations and the provider's statements to me.              Elias Fernandes MD  01/16/22 1009

## 2022-01-16 NOTE — ED NOTES
Paged pharmacy to tube adderal med, pharmacy called back and asked if patient required medication. Stated they don't give in the hospital, will not send unless patient requests. Patient sleeping, RN will ask when patient wakes up.

## 2022-01-16 NOTE — ED NOTES
"[facility]  ED Nurse Handoff Report    Greg Sage is a 36 year old male   ED Chief complaint: Abdominal Pain  . ED Diagnosis:   Final diagnoses:   Pleural effusion on right   Sepsis without acute organ dysfunction, due to unspecified organism (H)   Pneumonia of right lower lobe due to infectious organism     Allergies:   Allergies   Allergen Reactions     Bactrim [Sulfamethoxazole W/Trimethoprim]        Code Status: Full Code  Activity level - Baseline/Home:  Independent. Activity Level - Current:   Independent. Lift room needed: No. Bariatric: No   Needed: No   Isolation: Yes. Infection: Not Applicable  COVID r/o and special precautions.     Vital Signs:   Vitals:    01/16/22 0401 01/16/22 0445 01/16/22 0652   BP: 114/78     Pulse: (!) 132 (!) 124 120   Resp: 18 18 17   Temp: 97.8  F (36.6  C)     TempSrc: Oral     SpO2: 94% 97% 96%   Weight: 97.1 kg (214 lb)     Height: 1.854 m (6' 1\")         Cardiac Rhythm:  ,      Pain level:    Patient confused: No. Patient Falls Risk: No.   Elimination Status: Has voided   Patient Report - Initial Complaint: abdominal pain. Focused Assessment: RLQ pain x1 week now radiating to right shoulder. Diminished RLL posterior side  Tests Performed:   Abd/pelvis CT,  IV  contrast only TRAUMA / AAA   Final Result   IMPRESSION:    1.  Mildly improved but persistent, right greater than left, consolidation and reticulations, likely reflecting sequela of COVID pneumonia, correlate to exclude superimposed bacterial pneumonia.   2.  Interval development of small right pleural effusion.   3.  Fatty liver.   4.  No obstruction, colitis, diverticulitis, or appendicitis.           Labs Ordered and Resulted from Time of ED Arrival to Time of ED Departure   COMPREHENSIVE METABOLIC PANEL - Abnormal       Result Value    Sodium 137      Potassium 4.1      Chloride 105      Carbon Dioxide (CO2) 25      Anion Gap 7      Urea Nitrogen 12      Creatinine 0.64 (*)     " Calcium 8.8      Glucose 107 (*)     Alkaline Phosphatase 112      AST 49 (*)      (*)     Protein Total 7.1      Albumin 2.5 (*)     Bilirubin Total 0.6      GFR Estimate >90     CBC WITH PLATELETS AND DIFFERENTIAL - Abnormal    WBC Count 13.3 (*)     RBC Count 4.69      Hemoglobin 14.8      Hematocrit 44.1      MCV 94      MCH 31.6      MCHC 33.6      RDW 13.2      Platelet Count 375      % Neutrophils 64      % Lymphocytes 14      % Monocytes 11      % Eosinophils 8      % Basophils 1      % Immature Granulocytes 2      NRBCs per 100 WBC 0      Absolute Neutrophils 8.6 (*)     Absolute Lymphocytes 1.9      Absolute Monocytes 1.4 (*)     Absolute Eosinophils 1.1 (*)     Absolute Basophils 0.1      Absolute Immature Granulocytes 0.2      Absolute NRBCs 0.0     LIPASE - Normal    Lipase 135     LACTIC ACID WHOLE BLOOD - Normal    Lactic Acid 1.1     D DIMER QUANTITATIVE   Abnormal Results:   Treatments provided: see mar  Family Comments:   OBS brochure/video discussed/provided to patient:  N/A  ED Medications:   Medications   piperacillin-tazobactam (ZOSYN) intermittent infusion 4.5 g (4.5 g Intravenous New Bag 1/16/22 0709)   lactated ringers BOLUS 1,000 mL (has no administration in time range)   0.9% sodium chloride BOLUS (0 mLs Intravenous Stopped 1/16/22 0605)   HYDROmorphone (PF) (DILAUDID) injection 0.5 mg (0.5 mg Intravenous Given 1/16/22 0650)   iopamidol (ISOVUE-370) solution 500 mL (100 mLs Intravenous Given 1/16/22 0603)   Sodium Chloride 0.9 % Bag 500mL for CT Scan Flush Use (65 mLs Intravenous Given 1/16/22 0603)     Drips infusing:  Yes  For the majority of the shift, the patient's behavior Green. Interventions performed were n/a.    Sepsis treatment initiated: No     Patient tested for COVID 19 prior to admission: NO    ED Nurse Name/Phone Number: Hema Meyer RN,   7:14 AM  RECEIVING UNIT ED HANDOFF REVIEW    Above ED Nurse Handoff Report was reviewed: YES  Reviewed by: Alonso Rapp RN  on January 16, 2022 at 4:21 PM

## 2022-01-16 NOTE — ED PROVIDER NOTES
SO taken, covid recovered, tachy w pulm effusion, on abx, admitted.      Chaparro Gary MD  01/16/22 4770

## 2022-01-16 NOTE — PHARMACY-ADMISSION MEDICATION HISTORY
Admission medication history interview status for this patient is complete. See [facility] admission navigator for allergy information, prior to admission medications and immunization status.     Medication history interview done, indicate source(s): Patient  Medication history resources (including written lists, pill bottles, clinic record):SureScripts and Care Everywhere  Ph[facility]: [facility]    Changes made to PTA medication list:  Added: None  Changed: None  Reported as Not Taking: dexamethasone  Removed: None    Actions taken by pharmacist (provider contacted, etc): Spoke with pt to verify med list.     Additional medication history information:None    Medication reconciliation/reorder completed by provider prior to medication history?  N   (Y/N)     For patients on insulin therapy: N    Prior to Admission medications    Medication Sig Last Dose Taking? Auth Provider   albuterol (PROAIR HFA/PROVENTIL HFA/VENTOLIN HFA) 108 (90 Base) MCG/ACT inhaler Inhale 2 puffs into the lungs every 6 hours 1/15/2022 at 2300 Yes Ruth Goodrich MD   allopurinol (ZYLOPRIM) 100 MG tablet Take 100 mg by mouth daily 1/15/2022 at 1300 Yes Reported, Patient   amphetamine-dextroamphetamine (ADDERALL XR) 30 MG 24 hr capsule Take 30 mg by mouth daily  1/15/2022 at 1000 Yes Reported, Patient   apixaban ANTICOAGULANT (ELIQUIS ANTICOAGULANT) 2.5 MG tablet Take 1 tablet (2.5 mg) by mouth 2 times daily for 27 days 1/15/2022 at 1700 Yes Ruth Goodrich MD   nicotine (NICODERM CQ) 14 MG/24HR 24 hr patch Place 1 patch onto the skin every 24 hours 1/15/2022 at 1100 Yes Ruth Goodrich MD   dexamethasone (DECADRON) 6 MG tablet Take 1 tablet (6 mg) by mouth daily for 7 days  Patient not taking: Reported on 1/16/2022 Not Taking at Unknown time  Ruth Goodrich MD

## 2022-01-16 NOTE — H&P
History and Physical - Hospitalist Service       Date of Admission:  1/16/2022    Assessment & Plan      Greg Sage is a 36 year old male admitted on 1/16/2022. He has a past medical history significant for tobacco use disorder.  Recently diagnosed with COVID-19 infection.  Hospitalized 2 weeks ago for acute hypoxic respiratory failure related to COVID-19 pneumonia.  Return to emergency room today due to continued upper abdominal/lower chest pain.  Found to have small pleural effusion and continued infiltrates likely related to recent COVID-19 infection.  Started on antibiotics in the emergency room to cover possible bacterial pneumonia.    1.  Bilateral pneumonitis.  Suspect this is most likely related to his recent COVID-19 infection.  Symptom onset reportedly in late December.  Initial positive SARS-CoV-2 PCR reported in the outpatient setting in late December.  Hospitalized 1/2.  Able to discharge from the hospital on 1/4.  Did have 2 days of remdesivir during hospital stay.  Had also been started on dexamethasone.  Was discharged on dexamethasone and apixaban.  Does feel that his breathing is somewhat improved.  Does have a continued cough.  Has been having pain in his right chest and upper abdomen over the past 10 days.  Has been trying to take ibuprofen for pain control.  Does not feel that ibuprofen has been very helpful with pain control.  Notices the pain happens mostly with deep breath or cough.  -Pain medications as needed.  -Continue apixaban for DVT prophylaxis.  -Not hypoxic or requiring supplemental oxygen.  -Continue IV Zosyn started in the emergency room.  -Add azithromycin for now.  -Check procalcitonin level.  -If procalcitonin level not elevated, would keep antibiotic course short.  -I did recommend complete tobacco and vaping cessation.    2.  Tobacco use disorder.  Continues to vape.  -I did recommend complete tobacco and vaping cessation.  -Continue  nicotine patch.  -Additional nicotine gum if needed.    3.  Hepatitis.  ALT and AST mildly elevated.  Suspect related to fatty liver seen on CT scan and continued alcohol use.  -Would benefit from complete alcohol cessation going forward.  -He does currently state that he has decreased his alcohol use significantly over the past 2 weeks.  -Monitor for signs of alcohol withdrawal.  -Start multivitamin, folic acid, and thiamine for now.  -Recheck LFTs tomorrow.    4.  Right pleural effusion.  Small.  Likely related to recent COVID-19 infection.  -Pain medications as needed.  -Not hypoxic.  -Avoid IV fluids.     Diet: Regular Diet Adult    DVT Prophylaxis: DOAC  Gilman Catheter: Not present  Central Lines: None  Code Status: Full Code      Clinically Significant Risk Factors Present on Admission             # Coagulation Defect: home medication list includes an anticoagulant medication    # Overweight: last Body mass index is 28.23 kg/m .          Flaquito Bone DO  [facility]  Securely message with the [employer] Web Console (learn more here)  Text page via [employer] Paging/Directory        ______________________________________________________________________    Chief Complaint   Chest and abdominal pain.    History is obtained from the patient    History of Present Illness   Greg Sage is a 36 year old male who has a past medical history significant for tobacco use disorder.  Recently diagnosed with COVID-19 infection.  Hospitalized 2 weeks ago for acute hypoxic respiratory failure related to COVID-19 pneumonia.  Was able to discharge from the hospital on 1/4.  Breathing does feel that it is improved over the past 2 weeks.  Continues to cough.  Unfortunately, has been noticing that he has been having pain in his right lower chest and upper abdomen area.  Pain is mostly with deep breath or cough.  He has been trying to take ibuprofen at home for pain control.  Ibuprofen does not seem to be  helping much with pain control.  Has been having trouble sleeping because of pain.  Present to emergency room for further evaluation.  He has not noticed fevers or chills.  He does continue to smoke and vape.  Has cut down on the amount from before he was diagnosed with COVID.  He also has cut back significantly on alcohol use per his report.  No other acute complaints.    Review of Systems    The 10 point Review of Systems is negative other than noted in the HPI     Past Medical History    I have reviewed this patient's medical history and updated it with pertinent information if needed.   No past medical history on file.    Past Surgical History   I have reviewed this patient's surgical history and updated it with pertinent information if needed.  No past surgical history on file.    Social History   I have reviewed this patient's social history and updated it with pertinent information if needed.  Has cut back on alcohol use.  Now only using alcohol occasionally per his report.  Does continue to vape occasionally.  Less than previous.    Family History     No known cancer or coronary disease in the immediate family.    Prior to Admission Medications   Prior to Admission Medications   Prescriptions Last Dose Informant Patient Reported? Taking?   albuterol (PROAIR HFA/PROVENTIL HFA/VENTOLIN HFA) 108 (90 Base) MCG/ACT inhaler 1/15/2022 at 2300  No Yes   Sig: Inhale 2 puffs into the lungs every 6 hours   allopurinol (ZYLOPRIM) 100 MG tablet 1/15/2022 at 1300  Yes Yes   Sig: Take 100 mg by mouth daily   amphetamine-dextroamphetamine (ADDERALL XR) 30 MG 24 hr capsule 1/15/2022 at 1000  Yes Yes   Sig: Take 30 mg by mouth daily    apixaban ANTICOAGULANT (ELIQUIS ANTICOAGULANT) 2.5 MG tablet 1/15/2022 at 1700  No Yes   Sig: Take 1 tablet (2.5 mg) by mouth 2 times daily for 27 days   nicotine (NICODERM CQ) 14 MG/24HR 24 hr patch 1/15/2022 at 1100  No Yes   Sig: Place 1 patch onto the skin every 24 hours       Facility-Administered Medications: None     Allergies   Allergies   Allergen Reactions     Bactrim [Sulfamethoxazole W/Trimethoprim]        Physical Exam   Vital Signs: Temp: 97.8  F (36.6  C) Temp src: Oral BP: 123/77 Pulse: 108   Resp: 18 SpO2: 99 % O2 Device: None (Room air)    Weight: 214 lbs 0 oz    Gen:  NAD, A&Ox3.  Eyes:  PERRL, sclera anicteric.  OP:  MMM, no lesions.  Neck:  Supple.  CV:  Regular, no murmurs.  Lung:  CTA b/l, normal effort.  Ab:  +BS, soft.  Skin:  Warm, dry to touch.  No rash.  Ext:  No pitting edema LE b/l.      Data   Data reviewed today: I reviewed all medications, new labs and imaging results over the last 24 hours.    Recent Labs   Lab 01/16/22  0443   WBC 13.3*   HGB 14.8   MCV 94         POTASSIUM 4.1   CHLORIDE 105   CO2 25   BUN 12   CR 0.64*   ANIONGAP 7   LUDIN 8.8   *   ALBUMIN 2.5*   PROTTOTAL 7.1   BILITOTAL 0.6   ALKPHOS 112   *   AST 49*   LIPASE 135

## 2022-01-17 VITALS
WEIGHT: 211.9 LBS | HEART RATE: 95 BPM | TEMPERATURE: 97.6 F | BODY MASS INDEX: 28.08 KG/M2 | RESPIRATION RATE: 18 BRPM | DIASTOLIC BLOOD PRESSURE: 73 MMHG | OXYGEN SATURATION: 96 % | SYSTOLIC BLOOD PRESSURE: 121 MMHG | HEIGHT: 73 IN

## 2022-01-17 LAB
ALBUMIN SERPL-MCNC: 2.2 G/DL (ref 3.4–5)
ALP SERPL-CCNC: 101 U/L (ref 40–150)
ALT SERPL W P-5'-P-CCNC: 113 U/L (ref 0–70)
ANION GAP SERPL CALCULATED.3IONS-SCNC: 7 MMOL/L (ref 3–14)
AST SERPL W P-5'-P-CCNC: 34 U/L (ref 0–45)
BILIRUB SERPL-MCNC: 1.6 MG/DL (ref 0.2–1.3)
BUN SERPL-MCNC: 10 MG/DL (ref 7–30)
CALCIUM SERPL-MCNC: 8.5 MG/DL (ref 8.5–10.1)
CHLORIDE BLD-SCNC: 105 MMOL/L (ref 94–109)
CO2 SERPL-SCNC: 24 MMOL/L (ref 20–32)
CREAT SERPL-MCNC: 0.68 MG/DL (ref 0.66–1.25)
ERYTHROCYTE [DISTWIDTH] IN BLOOD BY AUTOMATED COUNT: 13.2 % (ref 10–15)
GFR SERPL CREATININE-BSD FRML MDRD: >90 ML/MIN/1.73M2
GLUCOSE BLD-MCNC: 99 MG/DL (ref 70–99)
HCT VFR BLD AUTO: 41.6 % (ref 40–53)
HGB BLD-MCNC: 13.6 G/DL (ref 13.3–17.7)
MCH RBC QN AUTO: 31.2 PG (ref 26.5–33)
MCHC RBC AUTO-ENTMCNC: 32.7 G/DL (ref 31.5–36.5)
MCV RBC AUTO: 95 FL (ref 78–100)
PLATELET # BLD AUTO: 313 10E3/UL (ref 150–450)
POTASSIUM BLD-SCNC: 4.1 MMOL/L (ref 3.4–5.3)
PROT SERPL-MCNC: 6.7 G/DL (ref 6.8–8.8)
RBC # BLD AUTO: 4.36 10E6/UL (ref 4.4–5.9)
SODIUM SERPL-SCNC: 136 MMOL/L (ref 133–144)
WBC # BLD AUTO: 9.8 10E3/UL (ref 4–11)

## 2022-01-17 PROCEDURE — 96376 TX/PRO/DX INJ SAME DRUG ADON: CPT

## 2022-01-17 PROCEDURE — 250N000011 HC RX IP 250 OP 636: Performed by: INTERNAL MEDICINE

## 2022-01-17 PROCEDURE — 80053 COMPREHEN METABOLIC PANEL: CPT | Performed by: INTERNAL MEDICINE

## 2022-01-17 PROCEDURE — 85014 HEMATOCRIT: CPT | Performed by: INTERNAL MEDICINE

## 2022-01-17 PROCEDURE — G0378 HOSPITAL OBSERVATION PER HR: HCPCS

## 2022-01-17 PROCEDURE — 250N000013 HC RX MED GY IP 250 OP 250 PS 637: Performed by: INTERNAL MEDICINE

## 2022-01-17 PROCEDURE — 36415 COLL VENOUS BLD VENIPUNCTURE: CPT | Performed by: INTERNAL MEDICINE

## 2022-01-17 RX ORDER — OXYCODONE HYDROCHLORIDE 5 MG/1
5 TABLET ORAL EVERY 4 HOURS PRN
Qty: 20 TABLET | Refills: 0 | Status: SHIPPED | OUTPATIENT
Start: 2022-01-17

## 2022-01-17 RX ADMIN — PIPERACILLIN AND TAZOBACTAM 4.5 G: 4; .5 INJECTION, POWDER, LYOPHILIZED, FOR SOLUTION INTRAVENOUS at 10:13

## 2022-01-17 RX ADMIN — FOLIC ACID 1 MG: 1 TABLET ORAL at 10:11

## 2022-01-17 RX ADMIN — PIPERACILLIN AND TAZOBACTAM 4.5 G: 4; .5 INJECTION, POWDER, LYOPHILIZED, FOR SOLUTION INTRAVENOUS at 02:10

## 2022-01-17 RX ADMIN — THIAMINE HCL TAB 100 MG 100 MG: 100 TAB at 10:11

## 2022-01-17 RX ADMIN — ALLOPURINOL 100 MG: 100 TABLET ORAL at 10:11

## 2022-01-17 RX ADMIN — APIXABAN 2.5 MG: 2.5 TABLET, FILM COATED ORAL at 10:11

## 2022-01-17 RX ADMIN — ACETAMINOPHEN 650 MG: 325 TABLET, FILM COATED ORAL at 02:14

## 2022-01-17 RX ADMIN — AZITHROMYCIN MONOHYDRATE 250 MG: 250 TABLET ORAL at 10:11

## 2022-01-17 RX ADMIN — THERA TABS 1 TABLET: TAB at 10:17

## 2022-01-17 RX ADMIN — OXYCODONE HYDROCHLORIDE 5 MG: 5 TABLET ORAL at 10:11

## 2022-01-17 RX ADMIN — OXYCODONE HYDROCHLORIDE 5 MG: 5 TABLET ORAL at 02:09

## 2022-01-17 NOTE — PLAN OF CARE
A&Ox4. IV SL. IND in the room. Tolerating a regular diet. Lungs are diminished. Denies cough. Pt taking oxycodone and tylenol for chest pain related to his lungs feeling tight. Taking Zosyn.   Plan: discharge plan pending based on pt progress.

## 2022-01-17 NOTE — PLAN OF CARE
Problem: Airway Clearance Ineffective  Goal: Effective Airway Clearance  Intervention: Promote Airway Secretion Clearance  Recent Flowsheet Documentation  Taken 1/16/2022 1800 by Alonso Rapp RN  Activity Management:    activity adjusted per tolerance    activity encouraged     Problem: Pain Acute  Goal: Acceptable Pain Control and Functional Ability  Outcome: Improving     Problem: Fall Injury Risk  Goal: Absence of Fall and Fall-Related Injury  Outcome: Improving    Pt is alert and oriented. He was admitted to the Unit from the ED. Pt is independent with cares. He is on special precaution d/t covid 19. He is RA, diminished lungs sound, sats is in the 90. Pt is on continuous O2 sats monitoring. He reported 8/10 pain in his chest d/t pneumonia, Oxycodone PRN was administered with effect. Will continue to monitor

## 2022-01-17 NOTE — PROGRESS NOTES
PRIMARY DIAGNOSIS: ACUTE PAIN  OUTPATIENT/OBSERVATION GOALS TO BE MET BEFORE DISCHARGE:  1. Pain Status: Improved-controlled with oral pain medications. PO oxycodone.     2. Return to near baseline physical activity: Yes-Independent.     3. Cleared for discharge by consultants (if involved): Yes    Discharge Planner Nurse   Safe discharge environment identified: Yes  Barriers to discharge: No       Entered by: Shereen Lynn 01/17/2022 12:08 PM     Pt A&O x4. VS stable; afebrile. Lung sounds: clear. CMS intact. Voiding in good amts.     Reviewed discharge instructions and medications with patient. Questions answered. Patient discharged to home via spouse with, discharge instructions, filled medications (oxycodone), and belongings at this time.

## 2022-01-18 ENCOUNTER — PATIENT OUTREACH (OUTPATIENT)
Dept: CARE COORDINATION | Facility: CLINIC | Age: 37
End: 2022-01-18
Payer: COMMERCIAL

## 2022-01-18 DIAGNOSIS — Z71.89 OTHER SPECIFIED COUNSELING: ICD-10-CM

## 2022-01-18 NOTE — PROGRESS NOTES
Clinic Care Coordination Contact  [facility]/Voicemail       Clinical Data: Care Coordinator Outreach  Outreach attempted x 1.  Left message on patient's voicemail with call back information and requested return call.  Plan:  Care Coordinator will try to reach patient again in 1-2 business days.    Katheryn Stern  Care Transitions Assistant  [facility]

## 2022-01-19 ENCOUNTER — HOSPITAL ENCOUNTER (INPATIENT)
Facility: CLINIC | Age: 37
LOS: 3 days | Discharge: HOME | End: 2022-01-22
Attending: EMERGENCY MEDICINE | Admitting: INTERNAL MEDICINE
Payer: COMMERCIAL

## 2022-01-19 ENCOUNTER — APPOINTMENT (OUTPATIENT)
Dept: CT IMAGING | Facility: CLINIC | Age: 37
End: 2022-01-19
Attending: EMERGENCY MEDICINE
Payer: COMMERCIAL

## 2022-01-19 ENCOUNTER — APPOINTMENT (OUTPATIENT)
Dept: GENERAL RADIOLOGY | Facility: CLINIC | Age: 37
End: 2022-01-19
Attending: EMERGENCY MEDICINE
Payer: COMMERCIAL

## 2022-01-19 DIAGNOSIS — R65.10 SIRS (SYSTEMIC INFLAMMATORY RESPONSE SYNDROME) (H): ICD-10-CM

## 2022-01-19 DIAGNOSIS — U07.1 INFECTION DUE TO 2019 NOVEL CORONAVIRUS: ICD-10-CM

## 2022-01-19 DIAGNOSIS — A41.9 SEPSIS WITHOUT ACUTE ORGAN DYSFUNCTION, DUE TO UNSPECIFIED ORGANISM (H): Primary | ICD-10-CM

## 2022-01-19 LAB
ANION GAP SERPL CALCULATED.3IONS-SCNC: 9 MMOL/L (ref 3–14)
ATRIAL RATE - MUSE: 157 BPM
BASE EXCESS BLDV CALC-SCNC: -0.9 MMOL/L (ref -7.7–1.9)
BASOPHILS # BLD AUTO: 0.1 10E3/UL (ref 0–0.2)
BASOPHILS NFR BLD AUTO: 1 %
BUN SERPL-MCNC: 8 MG/DL (ref 7–30)
CALCIUM SERPL-MCNC: 8.8 MG/DL (ref 8.5–10.1)
CHLORIDE BLD-SCNC: 105 MMOL/L (ref 94–109)
CO2 SERPL-SCNC: 23 MMOL/L (ref 20–32)
CREAT SERPL-MCNC: 0.75 MG/DL (ref 0.66–1.25)
CRP SERPL-MCNC: 136 MG/L (ref 0–8)
D DIMER PPP FEU-MCNC: 12.46 UG/ML FEU (ref 0–0.5)
DIASTOLIC BLOOD PRESSURE - MUSE: NORMAL MMHG
EOSINOPHIL # BLD AUTO: 0.5 10E3/UL (ref 0–0.7)
EOSINOPHIL NFR BLD AUTO: 3 %
ERYTHROCYTE [DISTWIDTH] IN BLOOD BY AUTOMATED COUNT: 13.1 % (ref 10–15)
ERYTHROCYTE [SEDIMENTATION RATE] IN BLOOD BY WESTERGREN METHOD: 37 MM/HR (ref 0–15)
GFR SERPL CREATININE-BSD FRML MDRD: >90 ML/MIN/1.73M2
GLUCOSE BLD-MCNC: 139 MG/DL (ref 70–99)
HCO3 BLDV-SCNC: 24 MMOL/L (ref 21–28)
HCT VFR BLD AUTO: 46.1 % (ref 40–53)
HGB BLD-MCNC: 15.1 G/DL (ref 13.3–17.7)
IMM GRANULOCYTES # BLD: 0.2 10E3/UL
IMM GRANULOCYTES NFR BLD: 1 %
INTERPRETATION ECG - MUSE: NORMAL
LACTATE SERPL-SCNC: 1 MMOL/L (ref 0.7–2)
LACTATE SERPL-SCNC: 2.5 MMOL/L (ref 0.7–2)
LYMPHOCYTES # BLD AUTO: 2.1 10E3/UL (ref 0.8–5.3)
LYMPHOCYTES NFR BLD AUTO: 12 %
MCH RBC QN AUTO: 31.6 PG (ref 26.5–33)
MCHC RBC AUTO-ENTMCNC: 32.8 G/DL (ref 31.5–36.5)
MCV RBC AUTO: 96 FL (ref 78–100)
MONOCYTES # BLD AUTO: 1.8 10E3/UL (ref 0–1.3)
MONOCYTES NFR BLD AUTO: 10 %
NEUTROPHILS # BLD AUTO: 12.9 10E3/UL (ref 1.6–8.3)
NEUTROPHILS NFR BLD AUTO: 73 %
NRBC # BLD AUTO: 0 10E3/UL
NRBC BLD AUTO-RTO: 0 /100
NT-PROBNP SERPL-MCNC: 10 PG/ML (ref 0–450)
O2/TOTAL GAS SETTING VFR VENT: 6 %
OXYHGB MFR BLDV: 68 % (ref 70–75)
P AXIS - MUSE: 23 DEGREES
PCO2 BLDV: 41 MM HG (ref 40–50)
PH BLDV: 7.38 [PH] (ref 7.32–7.43)
PLATELET # BLD AUTO: 341 10E3/UL (ref 150–450)
PO2 BLDV: 34 MM HG (ref 25–47)
POTASSIUM BLD-SCNC: 4.1 MMOL/L (ref 3.4–5.3)
PR INTERVAL - MUSE: 120 MS
PROCALCITONIN SERPL-MCNC: 0.12 NG/ML
QRS DURATION - MUSE: 80 MS
QT - MUSE: 250 MS
QTC - MUSE: 404 MS
R AXIS - MUSE: 9 DEGREES
RBC # BLD AUTO: 4.78 10E6/UL (ref 4.4–5.9)
SODIUM SERPL-SCNC: 137 MMOL/L (ref 133–144)
SYSTOLIC BLOOD PRESSURE - MUSE: NORMAL MMHG
T AXIS - MUSE: 19 DEGREES
TROPONIN I SERPL HS-MCNC: <3 NG/L
VENTRICULAR RATE- MUSE: 157 BPM
WBC # BLD AUTO: 17.4 10E3/UL (ref 4–11)

## 2022-01-19 PROCEDURE — 71045 X-RAY EXAM CHEST 1 VIEW: CPT

## 2022-01-19 PROCEDURE — 85025 COMPLETE CBC W/AUTO DIFF WBC: CPT | Performed by: EMERGENCY MEDICINE

## 2022-01-19 PROCEDURE — 84484 ASSAY OF TROPONIN QUANT: CPT | Performed by: EMERGENCY MEDICINE

## 2022-01-19 PROCEDURE — 250N000011 HC RX IP 250 OP 636: Performed by: EMERGENCY MEDICINE

## 2022-01-19 PROCEDURE — 87040 BLOOD CULTURE FOR BACTERIA: CPT | Performed by: EMERGENCY MEDICINE

## 2022-01-19 PROCEDURE — 258N000003 HC RX IP 258 OP 636: Performed by: EMERGENCY MEDICINE

## 2022-01-19 PROCEDURE — 120N000001 HC R&B MED SURG/OB

## 2022-01-19 PROCEDURE — 96376 TX/PRO/DX INJ SAME DRUG ADON: CPT

## 2022-01-19 PROCEDURE — 96366 THER/PROPH/DIAG IV INF ADDON: CPT

## 2022-01-19 PROCEDURE — 85652 RBC SED RATE AUTOMATED: CPT | Performed by: PHYSICIAN ASSISTANT

## 2022-01-19 PROCEDURE — 99223 1ST HOSP IP/OBS HIGH 75: CPT | Mod: AI | Performed by: PHYSICIAN ASSISTANT

## 2022-01-19 PROCEDURE — 86140 C-REACTIVE PROTEIN: CPT | Performed by: PHYSICIAN ASSISTANT

## 2022-01-19 PROCEDURE — 85379 FIBRIN DEGRADATION QUANT: CPT | Performed by: EMERGENCY MEDICINE

## 2022-01-19 PROCEDURE — 83605 ASSAY OF LACTIC ACID: CPT | Performed by: EMERGENCY MEDICINE

## 2022-01-19 PROCEDURE — 82310 ASSAY OF CALCIUM: CPT | Performed by: EMERGENCY MEDICINE

## 2022-01-19 PROCEDURE — 96361 HYDRATE IV INFUSION ADD-ON: CPT

## 2022-01-19 PROCEDURE — 36415 COLL VENOUS BLD VENIPUNCTURE: CPT | Performed by: EMERGENCY MEDICINE

## 2022-01-19 PROCEDURE — 250N000011 HC RX IP 250 OP 636: Performed by: PHYSICIAN ASSISTANT

## 2022-01-19 PROCEDURE — 96375 TX/PRO/DX INJ NEW DRUG ADDON: CPT

## 2022-01-19 PROCEDURE — 83880 ASSAY OF NATRIURETIC PEPTIDE: CPT | Performed by: EMERGENCY MEDICINE

## 2022-01-19 PROCEDURE — 93005 ELECTROCARDIOGRAM TRACING: CPT

## 2022-01-19 PROCEDURE — 258N000003 HC RX IP 258 OP 636: Performed by: PHYSICIAN ASSISTANT

## 2022-01-19 PROCEDURE — 250N000013 HC RX MED GY IP 250 OP 250 PS 637: Performed by: PHYSICIAN ASSISTANT

## 2022-01-19 PROCEDURE — 82805 BLOOD GASES W/O2 SATURATION: CPT | Performed by: EMERGENCY MEDICINE

## 2022-01-19 PROCEDURE — 99285 EMERGENCY DEPT VISIT HI MDM: CPT | Mod: 25

## 2022-01-19 PROCEDURE — 74177 CT ABD & PELVIS W/CONTRAST: CPT

## 2022-01-19 PROCEDURE — 84145 PROCALCITONIN (PCT): CPT | Performed by: PHYSICIAN ASSISTANT

## 2022-01-19 PROCEDURE — 96365 THER/PROPH/DIAG IV INF INIT: CPT | Mod: 59

## 2022-01-19 RX ORDER — LEVOFLOXACIN 5 MG/ML
500 INJECTION, SOLUTION INTRAVENOUS EVERY 24 HOURS
Status: DISCONTINUED | OUTPATIENT
Start: 2022-01-19 | End: 2022-01-22 | Stop reason: HOSPADM

## 2022-01-19 RX ORDER — HYDROXYZINE HYDROCHLORIDE 50 MG/1
50 TABLET, FILM COATED ORAL EVERY 6 HOURS PRN
Status: DISCONTINUED | OUTPATIENT
Start: 2022-01-19 | End: 2022-01-22 | Stop reason: HOSPADM

## 2022-01-19 RX ORDER — CEFAZOLIN SODIUM 1 G/50ML
2000 SOLUTION INTRAVENOUS ONCE
Status: COMPLETED | OUTPATIENT
Start: 2022-01-19 | End: 2022-01-19

## 2022-01-19 RX ORDER — IOPAMIDOL 755 MG/ML
100 INJECTION, SOLUTION INTRAVASCULAR ONCE
Status: COMPLETED | OUTPATIENT
Start: 2022-01-19 | End: 2022-01-19

## 2022-01-19 RX ORDER — BENZONATATE 100 MG/1
100 CAPSULE ORAL 3 TIMES DAILY PRN
Status: DISCONTINUED | OUTPATIENT
Start: 2022-01-19 | End: 2022-01-22 | Stop reason: HOSPADM

## 2022-01-19 RX ORDER — ACETAMINOPHEN 325 MG/1
650 TABLET ORAL EVERY 8 HOURS
Status: DISCONTINUED | OUTPATIENT
Start: 2022-01-19 | End: 2022-01-22 | Stop reason: HOSPADM

## 2022-01-19 RX ORDER — HYDROMORPHONE HYDROCHLORIDE 1 MG/ML
0.5 INJECTION, SOLUTION INTRAMUSCULAR; INTRAVENOUS; SUBCUTANEOUS ONCE
Status: COMPLETED | OUTPATIENT
Start: 2022-01-19 | End: 2022-01-19

## 2022-01-19 RX ORDER — OXYCODONE HYDROCHLORIDE 5 MG/1
5 TABLET ORAL EVERY 4 HOURS PRN
Status: DISCONTINUED | OUTPATIENT
Start: 2022-01-19 | End: 2022-01-22 | Stop reason: HOSPADM

## 2022-01-19 RX ORDER — HYDROMORPHONE HYDROCHLORIDE 1 MG/ML
0.5 INJECTION, SOLUTION INTRAMUSCULAR; INTRAVENOUS; SUBCUTANEOUS
Status: DISCONTINUED | OUTPATIENT
Start: 2022-01-19 | End: 2022-01-19

## 2022-01-19 RX ORDER — SODIUM CHLORIDE, SODIUM LACTATE, POTASSIUM CHLORIDE, CALCIUM CHLORIDE 600; 310; 30; 20 MG/100ML; MG/100ML; MG/100ML; MG/100ML
INJECTION, SOLUTION INTRAVENOUS CONTINUOUS
Status: DISCONTINUED | OUTPATIENT
Start: 2022-01-19 | End: 2022-01-20

## 2022-01-19 RX ORDER — LIDOCAINE 40 MG/G
CREAM TOPICAL
Status: DISCONTINUED | OUTPATIENT
Start: 2022-01-19 | End: 2022-01-22 | Stop reason: HOSPADM

## 2022-01-19 RX ORDER — NICOTINE 21 MG/24HR
1 PATCH, TRANSDERMAL 24 HOURS TRANSDERMAL DAILY
Status: DISCONTINUED | OUTPATIENT
Start: 2022-01-19 | End: 2022-01-22 | Stop reason: HOSPADM

## 2022-01-19 RX ORDER — LORAZEPAM 2 MG/ML
.5-1 INJECTION INTRAMUSCULAR EVERY 4 HOURS PRN
Status: DISCONTINUED | OUTPATIENT
Start: 2022-01-19 | End: 2022-01-22 | Stop reason: HOSPADM

## 2022-01-19 RX ORDER — KETOROLAC TROMETHAMINE 30 MG/ML
30 INJECTION, SOLUTION INTRAMUSCULAR; INTRAVENOUS EVERY 8 HOURS PRN
Status: DISCONTINUED | OUTPATIENT
Start: 2022-01-19 | End: 2022-01-20

## 2022-01-19 RX ORDER — NALOXONE HYDROCHLORIDE 0.4 MG/ML
0.4 INJECTION, SOLUTION INTRAMUSCULAR; INTRAVENOUS; SUBCUTANEOUS
Status: DISCONTINUED | OUTPATIENT
Start: 2022-01-19 | End: 2022-01-22 | Stop reason: HOSPADM

## 2022-01-19 RX ORDER — HYDROXYZINE HYDROCHLORIDE 25 MG/1
25 TABLET, FILM COATED ORAL EVERY 6 HOURS PRN
Status: DISCONTINUED | OUTPATIENT
Start: 2022-01-19 | End: 2022-01-22 | Stop reason: HOSPADM

## 2022-01-19 RX ORDER — NALOXONE HYDROCHLORIDE 0.4 MG/ML
0.2 INJECTION, SOLUTION INTRAMUSCULAR; INTRAVENOUS; SUBCUTANEOUS
Status: DISCONTINUED | OUTPATIENT
Start: 2022-01-19 | End: 2022-01-22 | Stop reason: HOSPADM

## 2022-01-19 RX ORDER — SODIUM CHLORIDE 9 MG/ML
INJECTION, SOLUTION INTRAVENOUS CONTINUOUS
Status: DISCONTINUED | OUTPATIENT
Start: 2022-01-19 | End: 2022-01-19

## 2022-01-19 RX ADMIN — SODIUM CHLORIDE: 9 INJECTION, SOLUTION INTRAVENOUS at 11:20

## 2022-01-19 RX ADMIN — TAZOBACTAM SODIUM AND PIPERACILLIN SODIUM 4.5 G: 500; 4 INJECTION, SOLUTION INTRAVENOUS at 10:38

## 2022-01-19 RX ADMIN — ENOXAPARIN SODIUM 40 MG: 40 INJECTION SUBCUTANEOUS at 20:29

## 2022-01-19 RX ADMIN — NICOTINE 1 PATCH: 14 PATCH, EXTENDED RELEASE TRANSDERMAL at 14:07

## 2022-01-19 RX ADMIN — SODIUM CHLORIDE, POTASSIUM CHLORIDE, SODIUM LACTATE AND CALCIUM CHLORIDE: 600; 310; 30; 20 INJECTION, SOLUTION INTRAVENOUS at 17:34

## 2022-01-19 RX ADMIN — OXYCODONE HYDROCHLORIDE 5 MG: 5 TABLET ORAL at 17:33

## 2022-01-19 RX ADMIN — HYDROMORPHONE HYDROCHLORIDE 0.5 MG: 1 INJECTION, SOLUTION INTRAMUSCULAR; INTRAVENOUS; SUBCUTANEOUS at 11:14

## 2022-01-19 RX ADMIN — IOPAMIDOL 100 ML: 755 INJECTION, SOLUTION INTRAVENOUS at 09:59

## 2022-01-19 RX ADMIN — SODIUM CHLORIDE 1000 ML: 9 INJECTION, SOLUTION INTRAVENOUS at 10:38

## 2022-01-19 RX ADMIN — VANCOMYCIN HYDROCHLORIDE 2000 MG: 5 INJECTION, POWDER, LYOPHILIZED, FOR SOLUTION INTRAVENOUS at 12:03

## 2022-01-19 RX ADMIN — LEVOFLOXACIN 500 MG: 500 INJECTION, SOLUTION INTRAVENOUS at 17:34

## 2022-01-19 RX ADMIN — HYDROXYZINE HYDROCHLORIDE 50 MG: 50 TABLET, FILM COATED ORAL at 20:29

## 2022-01-19 RX ADMIN — ACETAMINOPHEN 650 MG: 325 TABLET, FILM COATED ORAL at 17:33

## 2022-01-19 RX ADMIN — KETOROLAC TROMETHAMINE 30 MG: 30 INJECTION, SOLUTION INTRAMUSCULAR; INTRAVENOUS at 20:28

## 2022-01-19 RX ADMIN — LORAZEPAM 1 MG: 2 INJECTION INTRAMUSCULAR; INTRAVENOUS at 12:00

## 2022-01-19 RX ADMIN — HYDROMORPHONE HYDROCHLORIDE 0.5 MG: 1 INJECTION, SOLUTION INTRAMUSCULAR; INTRAVENOUS; SUBCUTANEOUS at 09:55

## 2022-01-19 ASSESSMENT — ACTIVITIES OF DAILY LIVING (ADL)
ADLS_ACUITY_SCORE: 12
ADLS_ACUITY_SCORE: 3
ADLS_ACUITY_SCORE: 12
ADLS_ACUITY_SCORE: 12
ADLS_ACUITY_SCORE: 5
ADLS_ACUITY_SCORE: 5
ADLS_ACUITY_SCORE: 12
ADLS_ACUITY_SCORE: 12
ADLS_ACUITY_SCORE: 3
ADLS_ACUITY_SCORE: 12

## 2022-01-19 ASSESSMENT — ENCOUNTER SYMPTOMS
NERVOUS/ANXIOUS: 1
SHORTNESS OF BREATH: 1

## 2022-01-19 ASSESSMENT — MIFFLIN-ST. JEOR: SCORE: 1963.19

## 2022-01-19 NOTE — ED NOTES
[facility]  ED Nurse Handoff Report    Greg Sage is a 36 year old male   ED Chief complaint: Chest Pain and Shortness of Breath  . ED Diagnosis:   Final diagnoses:   Infection due to 2019 novel coronavirus   SIRS (systemic inflammatory response syndrome) (H)     Allergies:   Allergies   Allergen Reactions     Bactrim [Sulfamethoxazole W/Trimethoprim]        Code Status: Full Code  Activity level - Baseline/Home:  Independent. Activity Level - Current:   Assist X 1. Lift room needed: No. Bariatric: No   Needed: No   Isolation: Yes. Infection: COVID r/o and special precautions.     Vital Signs:   Vitals:    01/19/22 1215 01/19/22 1230 01/19/22 1245 01/19/22 1300   BP: 129/78 127/85 121/81 123/79   Pulse: 112 116 112 107   Resp: 18 22 16 13   Temp:       TempSrc:       SpO2: 96% 98%  98%       Cardiac Rhythm:  ,   Cardiac  Cardiac Rhythm: Sinus tachycardia  Pain level:    Patient confused: No. Patient Falls Risk: Yes.   Elimination Status: Not in ED  Patient Report - Initial Complaint: CP/ SOB . Focused Assessment:  Greg Sage is a 36 year old male on Eliquis who presents via EMS with left-sided chest pain and shortness of breath. Of note, the patient tested positive for COVID-19 on 12/22/2021 after becoming symptomatic with shortness of breath, and was prescribed an albuterol inhaler for symptom management. Upon chart review patient was admitted to [facility] from 01/02 - 01/04/2022 for acute hypoxic respiratory failure. He was again admitted on 01/16/2022 where he was diagnosed with a small pleural effusion and send home with Augmentin.This morning at approximately 8:30 AM the patient had acute onset of chest pain, waking him up from sleep. He became concerned and ultimately called 911, EMS administered 12 mg adenosine en route with no change in patient's symptoms. The patient continues to have shortness of breath here in ED, also endorses some anxiety. His chest pain  typically happens with deep inspiration. Patient has no prior history of asthma. He has been taking his Eliquis as instructed since his most recent hospitalization though has not yet had today's dose.      Review of Systems   Respiratory: Positive for shortness of breath.    Cardiovascular: Positive for chest pain.   Psychiatric/Behavioral: The patient is nervous/anxious.    All other systems reviewed and are negative.  Tests Performed: EKG, Labs, Imaging . Abnormal Results:   Labs Ordered and Resulted from Time of ED Arrival to Time of ED Departure   BASIC METABOLIC PANEL - Abnormal       Result Value    Sodium 137      Potassium 4.1      Chloride 105      Carbon Dioxide (CO2) 23      Anion Gap 9      Urea Nitrogen 8      Creatinine 0.75      Calcium 8.8      Glucose 139 (*)     GFR Estimate >90     D DIMER QUANTITATIVE - Abnormal    D-Dimer Quantitative 12.46 (*)    BLOOD GAS VENOUS WITH OXYHEMOGLOBIN - Abnormal    pH Venous 7.38      pCO2 Venous 41      pO2 Venous 34      Bicarbonate Venous 24      FIO2 6      Oxyhemoglobin Venous 68 (*)     Base Excess/Deficit (+/-) -0.9     CBC WITH PLATELETS AND DIFFERENTIAL - Abnormal    WBC Count 17.4 (*)     RBC Count 4.78      Hemoglobin 15.1      Hematocrit 46.1      MCV 96      MCH 31.6      MCHC 32.8      RDW 13.1      Platelet Count 341      % Neutrophils 73      % Lymphocytes 12      % Monocytes 10      % Eosinophils 3      % Basophils 1      % Immature Granulocytes 1      NRBCs per 100 WBC 0      Absolute Neutrophils 12.9 (*)     Absolute Lymphocytes 2.1      Absolute Monocytes 1.8 (*)     Absolute Eosinophils 0.5      Absolute Basophils 0.1      Absolute Immature Granulocytes 0.2      Absolute NRBCs 0.0     LACTIC ACID WHOLE BLOOD - Abnormal    Lactic Acid 2.5 (*)    ERYTHROCYTE SEDIMENTATION RATE AUTO - Abnormal    Erythrocyte Sedimentation Rate 37 (*)    CRP INFLAMMATION - Abnormal    CRP Inflammation 136.0 (*)    TROPONIN I - Normal    Troponin I High Sensitivity  <3     NT PROBNP INPATIENT - Normal    N terminal Pro BNP Inpatient 10     LACTIC ACID WHOLE BLOOD - Normal    Lactic Acid 1.0     PROCALCITONIN   BLOOD CULTURE   BLOOD CULTURE     CT Chest (PE) Abdomen Pelvis w Contrast   Preliminary Result   IMPRESSION:   1.  No evidence for pulmonary embolism or aortic dissection.   2.  Small bilateral pleural effusions have increased compared to   1/16/2022.   3.  Patchy consolidation and groundglass opacities in both lower lungs   have overall increased slightly, and are again suspicious for   pneumonia.   4.  Hepatic steatosis.      XR Chest Port 1 View   Preliminary Result   IMPRESSION: Somewhat shallow inspiration. Small right pleural effusion   is new since the previous exam. Patchy infiltrates in both lower lungs   have improved. No pneumothorax. Pulmonary vascularity is within normal   limits.        .   Treatments provided: See MAR   Family Comments: N/A  OBS brochure/video discussed/provided to patient:  No  ED Medications:   Medications   sodium chloride 0.9% infusion ( Intravenous New Bag 1/19/22 1120)   HYDROmorphone (PF) (DILAUDID) injection 0.5 mg (0.5 mg Intravenous Given 1/19/22 1114)   vancomycin (VANCOCIN) 2,000 mg in sodium chloride 0.9 % 500 mL intermittent infusion (2,000 mg Intravenous New Bag 1/19/22 1203)   LORazepam (ATIVAN) injection 0.5-1 mg (1 mg Intravenous Given 1/19/22 1200)   HYDROmorphone (PF) (DILAUDID) injection 0.5 mg (0.5 mg Intravenous Given 1/19/22 0955)   iopamidol (ISOVUE-370) solution 100 mL (100 mLs Intravenous Given 1/19/22 0959)   sodium chloride (PF) 0.9% PF flush 88 mL (88 mLs Intravenous Given 1/19/22 1006)   piperacillin-tazobactam (ZOSYN) intermittent infusion 4.5 g (0 g Intravenous Stopped 1/19/22 1131)   0.9% sodium chloride BOLUS (0 mLs Intravenous Stopped 1/19/22 1228)     Drips infusing:  Yes  For the majority of the shift, the patient's behavior Green. Interventions performed were N/A.    Sepsis treatment initiated: No      Patient tested for COVID 19 prior to admission: YES    ED Nurse Name/Phone Number: Ama García RN,   1:08 PM  RECEIVING UNIT ED HANDOFF REVIEW    Above ED Nurse Handoff Report was reviewed: Yes  Reviewed by: Jeanette Ray RN on January 19, 2022 at 3:42 PM

## 2022-01-19 NOTE — PHARMACY-ADMISSION MEDICATION HISTORY
Admission medication history interview status for this patient is complete. See [facility] admission navigator for allergy information, prior to admission medications and immunization status.     Medication history interview done, indicate source(s): Patient and spouse  Medication history resources (including written lists, pill bottles, clinic record):Keen Impressions List, [place]  Pharmacy: [facility] [address]    Changes made to PTA medication list:  Added: MVI  Changed: Albuterol from scheduled to prn  Reported as Not Taking: None  Removed: None    Actions taken by pharmacist (provider contacted, etc):Called spouse for medication history     Additional medication history information:None    Medication reconciliation/reorder completed by provider prior to medication history?  N    Prior to Admission medications    Medication Sig Last Dose Taking? Auth Provider   albuterol (PROAIR HFA/PROVENTIL HFA/VENTOLIN HFA) 108 (90 Base) MCG/ACT inhaler Inhale 2 puffs into the lungs every 6 hours  Patient taking differently: Inhale 2 puffs into the lungs every 6 hours as needed  Past Week at Unknown time Yes Ruth Goodrich MD   allopurinol (ZYLOPRIM) 100 MG tablet Take 100 mg by mouth daily 1/18/2022 at Unknown time Yes Reported, Patient   amoxicillin-clavulanate (AUGMENTIN) 875-125 MG tablet Take 1 tablet by mouth 2 times daily for 7 days 1/18/2022 at Unknown time Yes Cesar Herzog MD   amphetamine-dextroamphetamine (ADDERALL XR) 30 MG 24 hr capsule Take 30 mg by mouth daily  1/18/2022 at Unknown time Yes Reported, Patient   apixaban ANTICOAGULANT (ELIQUIS ANTICOAGULANT) 2.5 MG tablet Take 1 tablet (2.5 mg) by mouth 2 times daily for 27 days 1/18/2022 at Unknown time Yes Ruth Goodrich MD   Multiple Vitamins-Minerals (ONE-A-DAY MENS HEALTH FORMULA PO) Take 1 tablet by mouth daily 1/18/2022 at Unknown time Yes Unknown, Entered By History   nicotine (NICODERM CQ) 14 MG/24HR 24 hr patch Place 1 patch onto the skin every  24 hours 1/18/2022 at Unknown time Yes Ruth Goodrich MD   oxyCODONE (ROXICODONE) 5 MG tablet Take 1 tablet (5 mg) by mouth every 4 hours as needed for moderate to severe pain 1/19/2022 at 0830 Yes Cesar Herzog MD

## 2022-01-19 NOTE — PHARMACY-VANCOMYCIN DOSING SERVICE
Pharmacy Vancomycin Initial Note  Date of Service 2022  Patient's  1985  36 year old, male    Indication: [facility] Pneumonia    Current estimated CrCl = Estimated Creatinine Clearance: 166.5 mL/min (based on SCr of 0.75 mg/dL).    Creatinine for last 3 days  2022:  6:08 AM Creatinine 0.68 mg/dL  2022:  9:43 AM Creatinine 0.75 mg/dL    Recent Vancomycin Level(s) for last 3 days  No results found for requested labs within last 72 hours.      Vancomycin IV Administrations (past 72 hours)                   vancomycin (VANCOCIN) 2,000 mg in sodium chloride 0.9 % 500 mL intermittent infusion (mg) 2,000 mg New Bag 22 1203                Nephrotoxins and other renal medications (From now, onward)    Start     Dose/Rate Route Frequency Ordered Stop    22 0000  vancomycin 1500 mg in 0.9% NaCl 250 ml intermittent infusion 1,500 mg         1,500 mg  over 90 Minutes Intravenous EVERY 12 HOURS 22 1757      22 1626  ketorolac (TORADOL) injection 30 mg         30 mg Intravenous EVERY 8 HOURS PRN 22 1626 22 1625          Contrast Orders - past 72 hours (72h ago, onward)            Start     Dose/Rate Route Frequency Ordered Stop    22 1000  iopamidol (ISOVUE-370) solution 100 mL         100 mL Intravenous ONCE 22 0958 22 0959          [employer] Prediction of Planned Initial Vancomycin Regimen  Loading dose: 30375ug IV x1 given  Regimen: 1500 mg IV every 12 hours.  Start time: 00:03 on 2022  Exposure target: AUC24 (range)400-600 mg/L.hr   AUC24,ss: 553 mg/L.hr  Probability of AUC24 > 400: 80 %  Ctrough,ss: 16.8 mg/L  Probability of Ctrough,ss > 20: 37 %  Probability of nephrotoxicity (Lodise VERA ): 12 %          Plan:  1. Start vancomycin  1500 mg IV q12h with 2000mg IV bolus given   2. Vancomycin monitoring method: AUC  3. Vancomycin therapeutic monitoring goal: 400-600 mg*h/L  4. Pharmacy will check vancomycin levels as  appropriate in 1-3 Days.    5. Serum creatinine levels will be ordered daily for the first week of therapy and at least twice weekly for subsequent weeks.      Rell Booker RPH

## 2022-01-19 NOTE — ED NOTES
Bed: ED16  Expected date:   Expected time:   Means of arrival: Ambulance  Comments:  Jordy Daugherty

## 2022-01-19 NOTE — ED PROVIDER NOTES
History   Chief Complaint:  Chest Pain and Shortness of Breath       The history is provided by the patient.      Greg Sage is a 36 year old male on Eliquis who presents via EMS with left-sided chest pain and shortness of breath. Of note, the patient tested positive for COVID-19 on 12/22/2021 after becoming symptomatic with shortness of breath, and was prescribed an albuterol inhaler for symptom management. Upon chart review patient was admitted to [facility] from 01/02 - 01/04/2022 for acute hypoxic respiratory failure. He was again admitted on 01/16/2022 with concern for pneumonia.  It appears as though he received Zosyn and azithromycin in the hospital and was discharged with Augmentin.   This morning at approximately 8:30 AM the patient had acute onset of chest pain, waking him up from sleep. He became concerned and ultimately called 911, EMS administered 12 mg adenosine en route with no change in patient's symptoms. The patient continues to have shortness of breath here in ED, also endorses some anxiety. His chest pain typically happens with deep inspiration. Patient has no prior history of asthma. He has been taking his Eliquis as instructed since his most recent hospitalization though has not yet had today's dose.     Review of Systems   Respiratory: Positive for shortness of breath.    Cardiovascular: Positive for chest pain.   Psychiatric/Behavioral: The patient is nervous/anxious.    All other systems reviewed and are negative.    Allergies:  Bactrim [Sulfamethoxazole W/Trimethoprim]    Medications:  Albuterol inhaler  Allopurinol  Adderall XR  Eliquis  Augmentin    Past Medical History:     Pleural effusion  ADD  Gut    Past Surgical History:    [place] teeth extraction    Social History:  The patient was accompanied to the ED by EMS.  Marital status:     Physical Exam     Patient Vitals for the past 24 hrs:   BP Temp Temp src Pulse Resp SpO2   01/19/22 1122 124/77 98.1  F (36.7  C)  Oral (!) 128 29 97 %   01/19/22 1100 128/68 -- -- (!) 123 24 98 %   01/19/22 1045 128/85 -- -- (!) 131 (!) 34 100 %   01/19/22 1030 128/77 -- -- (!) 138 29 99 %   01/19/22 1015 (!) 142/80 -- -- (!) 144 30 96 %   01/19/22 0946 -- 98.5  F (36.9  C) Oral -- -- --   01/19/22 0945 (!) 132/94 -- -- (!) 158 25 98 %   01/19/22 0938 (!) 132/94 -- -- (!) 161 18 99 %       Physical Exam  VS: Reviewed per above  HENT: Mucous membranes moist  EYES: sclera anicteric  CV: Rate as noted, regular rhythm.   RESP: Tachypnea, breath sounds are normal bilaterally.  GI: Moderate left sided tenderness without rebound/guarding, not distended.  NEURO: Alert, moving all extremities  MSK: No deformity of the extremities  SKIN: Warm and dry    Emergency Department Course   ECG  ECG obtained at 0944, ECG read at 0949  Sinus tachycardia  Tachycardia is worse.  Changes noted above.  Agree with computer interpretation.    N significant change as compared to prior, dated 01/01/2022.  Rate 157 bpm. HI interval 120 ms. QRS duration 80 ms. QT/QTc 250/404 ms. P-R-T axes 23 9 19.     Imaging:  XR Chest Port 1 View:  Somewhat shallow inspiration. Small right pleural effusion is new since the previous exam. Patchy infiltrates in both lower lungs have improved. No pneumothorax. Pulmonary vascularity is within normal limits.  Report per radiology.    CT Chest (PE) Abdomen Pelvis w Contrast:  1.  No evidence for pulmonary embolism or aortic dissection.  2.  Small bilateral pleural effusions have increased compared to 1/16/2022.  3.  Patchy consolidation and groundglass opacities in both lower lungs have overall increased slightly, and are again suspicious for pneumonia.  4.  Hepatic steatosis.  Report per radiology.    Laboratory:  CBC: WBC 17.4 (H), HGB 15.1,   BMP: Glucose 139 (H), o/w WNL (Creatinine 0.75)    D dimer: 12.46 (H)    Lactic acid (result time 1002): 2.5 (H)    Lactic acid (result time 1129): 1.0      Blood gas venous with  Oxyhemoglobin: pH Venous 7.38, PCO2 Venous 41, PO2 Venous 34, Bicarbonate 24, Base Excess -0.9, Oxyhemoglobin 68 (L), FIO2 6     Troponin I high sensitivity (Collected 0943): <3   BNP: 10    Blood Culture x2: pending    Procedures  None.    Emergency Department Course:    Reviewed:  I reviewed nursing notes, vitals, past medical history and Care Everywhere    Assessments:  0939 I obtained history and examined the patient in ED room 06 as noted above.   1044 I rechecked the patient and explained findings. Discussed plan for admission.     Consults:  1116 I consulted with Sherita Dumont PA-C for Dr. Solis of the hospitalist service regarding patient, who agrees to admit patient to hospital in monitored bed.     Interventions:  0955 Dilaudid 0.5 mg  1038 NS 1L IV Bolus  1038 Zosyn 4.5 g IV Bolus  1114 Dilaudid 0.5 mg IV  1120  mL/hr IV Bolus     Disposition:  The patient was admitted to the hospital under the care of Dr. Solis.     Impression & Plan     CMS Diagnoses:   The patient has signs of Severe Sepsis        If one the following conditions is present, a 30 mL/kg bolus is recommended as part of the 6 hour bundle (IBW can be used for BMI >30, or document refusal/contraindication):      1.   Initial hypotension  defined as 2 bps < 90 or map < 65 in the 6hrs before or 6hrs after time zero.     2.  Lactate >4.     The patient has signs of Severe Sepsis as evidenced by:    1. 2 SIRS criteria, AND  2. Suspected infection, AND   3. Organ dysfunction: Lactic Acidosis with value >2.0    Time severe sepsis diagnosis confirmed: 1002  01/19/22 as this was the time when Lactate resulted, and the level was > 2.0    3 Hour Severe Sepsis Bundle Completion:    1. Initial Lactic Acid Result:   Recent Labs   Lab Test 01/19/22  1115 01/19/22  0959 01/16/22  0536   LACT 1.0 2.5* 1.1     2. Blood Cultures before Antibiotics: Yes  3. Broad Spectrum Antibiotics Administered:  yes       Anti-infectives (From admission through now)  "   Start     Dose/Rate Route Frequency Ordered Stop    01/19/22 1135  vancomycin (VANCOCIN) 2,000 mg in sodium chloride 0.9 % 500 mL intermittent infusion         2,000 mg  over 2 Hours Intravenous ONCE 01/19/22 1136      01/19/22 1010  piperacillin-tazobactam (ZOSYN) intermittent infusion 4.5 g        Note to Pharmacy: For [facility], [facility] and [facility]: For Zosyn-naive patients, use the \"Zosyn initial dose + extended infusion\" order panel.    4.5 g  200 mL/hr over 30 Minutes Intravenous ONCE 01/19/22 1009 01/19/22 1131          4. Fluid volume administered in ED:  Full bolus NOT administered due to CHF and acute Pulmonary Edema    BMI Readings from Last 1 Encounters:   01/16/22 27.96 kg/m      30 mL/kg fluids based on weight: 2,880 mL  30 mL/kg fluids based on IBW (must be >= 60 inches tall): 2,400 mL                Severe Sepsis reassessment:  1. Repeat Lactic Acid Level: 1.0 (1129)  2. MAP>65 after initial IVF bolus, will continue to monitor fluid status and vital signs    Medical Decision Making:  Patient presents to the ER for evaluation of shortness of breath, left-sided chest and abdominal pain.  On arrival patient is tachycardic up to 160s.  Paramedics trialed adenosine in route without success.  EKG confirms sinus tachycardia without specific ischemic change.  Lungs are clear to auscultation but he is quite tachypneic.  He was placed on empiric supplemental O2 per facemask although was never noted to be hypoxic. Portable chest x-ray shows small right pleural effusion and patchy infiltrates in both lower lungs, which have improved from prior imaging studies.  Patient was recently admitted with COVID-pneumonia and then again last week with concern for possible superimposed bacterial pneumonia.  CT of the chest and abdomen today does not show PE or other acute intrathoracic or intra-abdominal process.  There is evidence of small bilateral pleural effusions that have increased compared to 1/16 as well as patchy " consolidation in both lower lungs that have overall increased and are again suspicious for pneumonia.  Patient was given broad-spectrum antibiotics.  He had leukocytosis of 17 and lactic acidosis of 2.5.  After IV fluid bolus lactic acid normalized.  I was cautious to provide full 30 cc/kg of IV fluids due to underlying pleural effusions and recent COVID infection.  After resuscitative medications and pain medications, heart rate improved to the low 100s and his tachypnea improved as well.  He was admitted to the hospital for further cares.    Diagnosis:    ICD-10-CM    1. Infection due to 2019 novel coronavirus  U07.1    2. SIRS (systemic inflammatory response syndrome) (H)  R65.10        Scribe Disclosure:  Laine FAUSTIN, am serving as a scribe at 9:39 AM on 1/19/2022 to document services personally performed by Neville Espinoza MD based on my observations and the provider's statements to me.        Neville Espinoza MD  01/19/22 5305

## 2022-01-19 NOTE — ED TRIAGE NOTES
Pt arrives via EMS, EMS reports that pt comes from home where he was diagnosed with COVID in December, pt was discharged with albuterol and home O2, PT reports that he woke up with morning with left sided CP. Pt was given 12 of adenosine en route with no change. PT VSS

## 2022-01-19 NOTE — PROGRESS NOTES
Patient has been ill since 12/22/21 Per H&P ([place]).      Patient MAY qualify for COVID recovered status if Provider is in agreement per the below(fever free for 24 hours and substantial improvement in COVID symptoms - which I am unsure of and need Provider input on this):    .SEVERE-TO-CRITICAL ILLNESS OR MILDLY IMMUNOCOMPROMISED:    Following criteria for mildly immunocompromised patients OR those with severe-to-critical COVID-19 illness,  patient meets criteria for discontinuation of Special Precautions:    FOR SYMPTOMATIC - 20 days following symptom onset, >24 hr fever free without fever-reducing meds, AND substantial improvement in COVID-19 symptoms.    FOR ASYMPTOMATIC - 20 days from positive test AND no COVID-19 symptom development in 20-day timeframe.    Special Precautions discontinued January 19, 2022. Patient is considered recovered for 90 days from symptom onset.

## 2022-01-19 NOTE — H&P
[facility] Hospitalist Admission Note  Name: Greg Sage    MRN: [id number]  YOB: 1985    Age: 36 year old  Date of admission: 1/19/2022  Primary care provider: Candy Julian    Assessment & Plan   Greg Sage is a 36 year old male with Pmhx significant for heavy alcohol use with resultant hepatic steatosis, tobacco use disorder, COLTON, ADHD, gout, who was admitted with dyspnea after presenting in acute respiratory distress on 1/19/2022.     Mr. Sage has been ill since around 12/23/2021. He was admitted to our hospital ICU from 1/1/2022-1/5/2022 for severe hypoxemic respiratory failure due to COVID-19 pneumonia after presenting with dyspnea and left-sided chest discomfort.  He was treated during hospitalization with dexamethasone, remdesivir, Rocephin and Zithromax for suspected secondary bacterial pneumonia.  He discharged on a course of oral Ceftin and doxycycline with home oxygen, as well as eliquis for DVT prophylaxis.     He was subsequently re-admitted on 1/16/2022-1/17/2022 treated for bilateral pneumonitis with Zosyn after presenting with continued upper abdominal/lower chest pain.  He discharged the next day on augmentin, remained on eliquis for DVT ppx. He was prescribed oxycodone at discharged for pain.     In the ED placed on 6 L/min via nasal cannula with an oxygen saturation recorded of 100% tachypneic with respiratory rate recorded 25-34.  Blood pressure 128/85, heart rate 131.  Lab work was notable for a D dimer elevated at 12.46. High sensitivity troponin and NT BNP within range. Lactic acid elevated at 2.5. VBG reassuring. BMP WNL. CBC with neutrophilic leukocytosis of 17.4k. Blood cultures were drawn. Imaging showed no evidence of PE. There were small bilateral pleural effusions increased from prior study on 1/16/2022 and increased patchy consolidations bilaterally. EKG shows sinus tachycardia at a ventricular rate of 157.     Discussed with Dr. Espinoza in  "the ED, full chart review including lab work, imaging, and vital signs were reviewed. Patient received dilaudid, Zosyn, 1 L fluids in the ED. Admission was requested for further management.     Sepsis  Acute Respiratory Distress   Pneumonia    Presented with tachypnea, cough and pleuritic chest discomfort. Maintaining oxygen saturations on 6 L/min oxy mask.  Lab work with leukocytosis, elevated D-dimer, lactic acidosis with increased bilateral consolidation, opacities in lower lungs and small pleural effusions. Concern is for untreated HAP in the setting of residual imaging findings secondary to prior viral pneumonia, pneumonitis. RF for HAP, would think treated against suspected pathogens. Question possible history of vaping associated lung injury. He was not treated previously with baricitinib during hospitalization for COVID given concern for superimposed pneumonia procalcitonin almost 20.   -Continue oxygen support (has been on intermittent chronic supplemental o2 since discharge in early January)  -Scheduled anti-inflammatories for pleuritic pain, anxiolytics  -obtain sputum cx  -BCx NGTD  -monitor fever curve, cbc  -Abx: stop Zosyn. Give Levaquin for gram negative organisms, Vancomycin for MRSA coverage  -monitor clinically for improvement, may narrow over the next 24-48 hours. Consider ID, Pulmonary involvement pending course and response to treatment   -Hold prior to admission prophylactic DOAC, administer Lovenox for DVT prophylaxis while admitted    Tobacco Dependence  History of tobacco use, e cigarettes. Has not been utilizing for \"several weeks\" due to illness.   -NRT as needed    Heavy Alcohol Use   Hepatic Steatosis   No e/o current withdrawal or intoxication.Last drink over 72 hours ago.   -monitor. No CIWA in place  -Recommend etoh cessation and outpatient PCP follow up.     Awaiting formal pharmacy reconciliation to resume home medications.     DVT Prophylaxis: Enoxaparin (Lovenox) SQ  Code Status: " "Full Code  Expected Discharge: 1/22/2021       Sherita Dumont PA-C    Primary Care Physician   Candy Julian    Chief Complaint   \"Shortness of breath\"    History is obtained from the patient   Services Used: No    History of Present Illness   Greg Sage is a 36 year old male with Pmhx significant for heavy alcohol use with resultant hepatic steatosis, tobacco use disorder, COLTON, ADHD, gout, who presents with shortness of breath. He was brought to the emergency department via EMS in respiratory distress. He was given adenosine per report from EMS with no change in symptoms.    His wife found him on the couch this morning, tachypneic. He reported feeling short of breath despite having an oxygen saturation of 95%.  Wearing his home oxygen did not improve his shortness of breath.  Due to his tachypnea his wife called 911 for evaluation.    Mr. Sage also reports chest discomfort in his left lateral lower rib cage that is increased with inspiration and coughing.  He took a dose of oxycodone which did not help his pain at home.  He reported that yesterday he has been coughing more.  He feels short of breath both at rest and with activity.  Sitting forward improves his pleuritic chest discomfort and shortness of breath.  He reported that he had been compliant with his medications including his anticoagulation and antibiotics.  He has been wearing his oxygen at home.  He has gone several weeks without vaping and has not had any alcohol for over 72 hours.  He is feeling improved in the emergency department after receiving Dilaudid.     Past Medical History    Heavy alcohol use  Fatty liver  Tobacco dependence with vaping history   Gouty arthritis  ADHD  COLTON     Past Surgical History   [place] teeth extraction    Prior to Admission Medications   Prior to Admission Medications   Prescriptions Last Dose Informant Patient Reported? Taking?   albuterol (PROAIR HFA/PROVENTIL HFA/VENTOLIN HFA) 108 (90 " Base) MCG/ACT inhaler   No No   Sig: Inhale 2 puffs into the lungs every 6 hours   allopurinol (ZYLOPRIM) 100 MG tablet   Yes No   Sig: Take 100 mg by mouth daily   amoxicillin-clavulanate (AUGMENTIN) 875-125 MG tablet   No No   Sig: Take 1 tablet by mouth 2 times daily for 7 days   amphetamine-dextroamphetamine (ADDERALL XR) 30 MG 24 hr capsule   Yes No   Sig: Take 30 mg by mouth daily    apixaban ANTICOAGULANT (ELIQUIS ANTICOAGULANT) 2.5 MG tablet   No No   Sig: Take 1 tablet (2.5 mg) by mouth 2 times daily for 27 days   nicotine (NICODERM CQ) 14 MG/24HR 24 hr patch   No No   Sig: Place 1 patch onto the skin every 24 hours   oxyCODONE (ROXICODONE) 5 MG tablet   No No   Sig: Take 1 tablet (5 mg) by mouth every 4 hours as needed for moderate to severe pain      Facility-Administered Medications: None     Allergies   Allergies   Allergen Reactions     Bactrim [Sulfamethoxazole W/Trimethoprim]      Social History   Lives with spouse. History of vaping, quit for a period of time in . History of heavy alcohol use with daily drinking more than 16 ounces. He denies any other substance use or IV Drug use.     Family History   Reviewed, notable for malignancy in both parents.  Father is .    Review of Systems   The 10 point Review of Systems is negative other than noted in the HPI or here.     Physical Exam   Temp: 98.5  F (36.9  C) Temp src: Oral BP: 128/68 Pulse: (!) 123   Resp: 24 SpO2: 98 % O2 Device: Oxymask Oxygen Delivery: 6 LPM  Vital Signs with Ranges  Temp:  [98.5  F (36.9  C)] 98.5  F (36.9  C)  Pulse:  [123-161] 123  Resp:  [18-34] 24  BP: (128-142)/(68-94) 128/68  SpO2:  [96 %-100 %] 98 %  0 lbs 0 oz    Constitutional: Awake, alert, uncomfortable appearing and tachypneic sitting upright in the stretcher.  Eyes: Conjunctiva and pupils examined and normal.  HEENT: Non traumatic. Moist mucous membranes, normal dentition.  Respiratory: Tachypneic, slightly diminished capacity to speak in complete  sentences.  Very shallow inspiratory capacity.  Bilateral lung sounds are diminished with end inspiratory crackles.  No wheezing.    Cardiovascular: Tachycardic rate and rhythm, normal S1 and S2, and no murmur noted.  GI: Soft, non-distended, non-tender, bowel sounds present. No rebound tenderness or guarding.  Lymph/Hematologic: No anterior cervical or supraclavicular adenopathy.  Skin: Warm, dry. No edema.  Musculoskeletal: No gross deformities noted.  No erythema or tenderness. Moving all extremities.  Neurologic: No tremor. Speech is clear. Moving all extremities with symmetrical strength. CN 2-12 grossly intact.  Coordination and sensation intact.   Psychiatric: Appropriate affect.    Data   Data reviewed today:    Imaging:   Recent Results (from the past 24 hour(s))   XR Chest Port 1 View    Narrative    CHEST ONE VIEW PORTABLE   1/19/2022 9:56 AM     HISTORY:  Chest pain. Shortness of breath.    COMPARISON: 1/1/2022.      Impression    IMPRESSION: Somewhat shallow inspiration. Small right pleural effusion  is new since the previous exam. Patchy infiltrates in both lower lungs  have improved. No pneumothorax. Pulmonary vascularity is within normal  limits.   CT Chest (PE) Abdomen Pelvis w Contrast    Narrative    CT CHEST PE ABDOMEN AND PELVIS WITH CONTRAST 1/19/2022 10:11 AM    CLINICAL HISTORY: Left-sided chest pain. Abdominal pain. Respiratory  distress.  TECHNIQUE: CT angiogram chest and routine CT abdomen pelvis with IV  contrast. Arterial phase through the chest and venous phase through  the abdomen and pelvis. 2D and 3D MIP reconstructions were preformed  by the CT technologist. Dose reduction techniques were used.   CONTRAST: 100mL Isovue-370  COMPARISON: CT of the chest, abdomen, and pelvis performed 1/16/2022.    FINDINGS:  ANGIOGRAM CHEST: Pulmonary arteries are normal caliber and negative  for pulmonary emboli. Thoracic aorta is negative for dissection. No CT  evidence of right heart  strain.    LUNGS AND PLEURA: Small bilateral pleural effusions, larger on the  right, increased compared to 1/16/2022. Patchy consolidation and  groundglass opacities in both lower lungs have overall increased  slightly, and are suspicious for pneumonia. No pneumothorax.    MEDIASTINUM/AXILLAE: Mild mediastinal adenopathy is unchanged, and may  be reactive. No pericardial effusion.    CORONARY ARTERY CALCIFICATION: None.    HEPATOBILIARY: Diffuse fatty infiltration of the liver. No hepatic  masses. Unremarkable gallbladder.    PANCREAS: Normal.    SPLEEN: Normal.    ADRENAL GLANDS: Normal.    KIDNEYS/BLADDER: Unremarkable. No hydronephrosis.    BOWEL: No bowel obstruction. No convincing evidence for colitis or  diverticulitis. Unremarkable appendix.    PELVIC ORGANS: Unremarkable.    LYMPH NODES: No enlarged lymph nodes are identified in the abdomen or  pelvis.    VASCULATURE: Unremarkable.    ADDITIONAL FINDINGS: None.    MUSCULOSKELETAL: Unremarkable.      Impression    IMPRESSION:  1.  No evidence for pulmonary embolism or aortic dissection.  2.  Small bilateral pleural effusions have increased compared to  1/16/2022.  3.  Patchy consolidation and groundglass opacities in both lower lungs  have overall increased slightly, and are again suspicious for  pneumonia.  4.  Hepatic steatosis.       Recent Labs   Lab 01/19/22  0943 01/17/22  0608 01/16/22  0443   WBC 17.4* 9.8 13.3*   HGB 15.1 13.6 14.8   MCV 96 95 94    313 375    136 137   POTASSIUM 4.1 4.1 4.1   CHLORIDE 105 105 105   CO2 23 24 25   BUN 8 10 12   CR 0.75 0.68 0.64*   ANIONGAP 9 7 7   LUDIN 8.8 8.5 8.8   * 99 107*   ALBUMIN  --  2.2* 2.5*   PROTTOTAL  --  6.7* 7.1   BILITOTAL  --  1.6* 0.6   ALKPHOS  --  101 112   ALT  --  113* 159*   AST  --  34 49*   LIPASE  --   --  135       Sherita Dumont PA-C on 1/19/2022 at 11:07 AM

## 2022-01-19 NOTE — PROGRESS NOTES
Clinic Care Coordination Contact    Background: Care Coordination referral placed from [facility] discharge report for reason of patient meeting criteria for a TCM outreach call by [facility] team.    Assessment: Upon chart review, [facility] Team member will cancel/close the referral for TCM outreach due to reason below:    Patient has presented to Emergency Department or has been readmitted to hospital    Plan: Care Coordination referral for TCM outreach canceled.    Flores Stern MA  [facility] [place], [facility]

## 2022-01-20 LAB
ALBUMIN SERPL-MCNC: 2 G/DL (ref 3.4–5)
ALP SERPL-CCNC: 107 U/L (ref 40–150)
ALT SERPL W P-5'-P-CCNC: 74 U/L (ref 0–70)
ANION GAP SERPL CALCULATED.3IONS-SCNC: 5 MMOL/L (ref 3–14)
AST SERPL W P-5'-P-CCNC: 22 U/L (ref 0–45)
BILIRUB DIRECT SERPL-MCNC: 0.3 MG/DL (ref 0–0.2)
BILIRUB SERPL-MCNC: 0.9 MG/DL (ref 0.2–1.3)
BUN SERPL-MCNC: 9 MG/DL (ref 7–30)
CALCIUM SERPL-MCNC: 8.5 MG/DL (ref 8.5–10.1)
CHLORIDE BLD-SCNC: 108 MMOL/L (ref 94–109)
CO2 SERPL-SCNC: 25 MMOL/L (ref 20–32)
CREAT SERPL-MCNC: 0.6 MG/DL (ref 0.66–1.25)
ERYTHROCYTE [DISTWIDTH] IN BLOOD BY AUTOMATED COUNT: 12.9 % (ref 10–15)
GFR SERPL CREATININE-BSD FRML MDRD: >90 ML/MIN/1.73M2
GLUCOSE BLD-MCNC: 98 MG/DL (ref 70–99)
HCT VFR BLD AUTO: 38.5 % (ref 40–53)
HGB BLD-MCNC: 12.5 G/DL (ref 13.3–17.7)
MCH RBC QN AUTO: 30.9 PG (ref 26.5–33)
MCHC RBC AUTO-ENTMCNC: 32.5 G/DL (ref 31.5–36.5)
MCV RBC AUTO: 95 FL (ref 78–100)
MRSA DNA SPEC QL NAA+PROBE: NEGATIVE
PLATELET # BLD AUTO: 270 10E3/UL (ref 150–450)
POTASSIUM BLD-SCNC: 3.5 MMOL/L (ref 3.4–5.3)
POTASSIUM BLD-SCNC: 3.6 MMOL/L (ref 3.4–5.3)
PROT SERPL-MCNC: 6.9 G/DL (ref 6.8–8.8)
RBC # BLD AUTO: 4.04 10E6/UL (ref 4.4–5.9)
SA TARGET DNA: NEGATIVE
SODIUM SERPL-SCNC: 138 MMOL/L (ref 133–144)
WBC # BLD AUTO: 8.8 10E3/UL (ref 4–11)

## 2022-01-20 PROCEDURE — 85027 COMPLETE CBC AUTOMATED: CPT | Performed by: PHYSICIAN ASSISTANT

## 2022-01-20 PROCEDURE — 120N000001 HC R&B MED SURG/OB

## 2022-01-20 PROCEDURE — 36415 COLL VENOUS BLD VENIPUNCTURE: CPT | Performed by: INTERNAL MEDICINE

## 2022-01-20 PROCEDURE — 250N000013 HC RX MED GY IP 250 OP 250 PS 637: Performed by: PHYSICIAN ASSISTANT

## 2022-01-20 PROCEDURE — 87641 MR-STAPH DNA AMP PROBE: CPT | Performed by: INTERNAL MEDICINE

## 2022-01-20 PROCEDURE — 258N000003 HC RX IP 258 OP 636: Performed by: INTERNAL MEDICINE

## 2022-01-20 PROCEDURE — 82310 ASSAY OF CALCIUM: CPT | Performed by: PHYSICIAN ASSISTANT

## 2022-01-20 PROCEDURE — 250N000011 HC RX IP 250 OP 636: Performed by: INTERNAL MEDICINE

## 2022-01-20 PROCEDURE — 250N000011 HC RX IP 250 OP 636: Performed by: PHYSICIAN ASSISTANT

## 2022-01-20 PROCEDURE — 99233 SBSQ HOSP IP/OBS HIGH 50: CPT | Performed by: INTERNAL MEDICINE

## 2022-01-20 PROCEDURE — 82248 BILIRUBIN DIRECT: CPT | Performed by: INTERNAL MEDICINE

## 2022-01-20 PROCEDURE — 250N000013 HC RX MED GY IP 250 OP 250 PS 637: Performed by: INTERNAL MEDICINE

## 2022-01-20 PROCEDURE — 36415 COLL VENOUS BLD VENIPUNCTURE: CPT | Performed by: PHYSICIAN ASSISTANT

## 2022-01-20 PROCEDURE — 84132 ASSAY OF SERUM POTASSIUM: CPT | Performed by: INTERNAL MEDICINE

## 2022-01-20 RX ORDER — FUROSEMIDE 10 MG/ML
40 INJECTION INTRAMUSCULAR; INTRAVENOUS EVERY 12 HOURS
Status: COMPLETED | OUTPATIENT
Start: 2022-01-20 | End: 2022-01-20

## 2022-01-20 RX ORDER — FUROSEMIDE 10 MG/ML
40 INJECTION INTRAMUSCULAR; INTRAVENOUS ONCE
Status: DISCONTINUED | OUTPATIENT
Start: 2022-01-20 | End: 2022-01-20

## 2022-01-20 RX ORDER — IBUPROFEN 400 MG/1
400 TABLET, FILM COATED ORAL EVERY 6 HOURS PRN
Status: DISCONTINUED | OUTPATIENT
Start: 2022-01-20 | End: 2022-01-22 | Stop reason: HOSPADM

## 2022-01-20 RX ORDER — ALLOPURINOL 100 MG/1
100 TABLET ORAL DAILY
Status: DISCONTINUED | OUTPATIENT
Start: 2022-01-20 | End: 2022-01-22 | Stop reason: HOSPADM

## 2022-01-20 RX ORDER — ALBUTEROL SULFATE 90 UG/1
2 AEROSOL, METERED RESPIRATORY (INHALATION) EVERY 6 HOURS
Status: DISCONTINUED | OUTPATIENT
Start: 2022-01-20 | End: 2022-01-22 | Stop reason: HOSPADM

## 2022-01-20 RX ORDER — FUROSEMIDE 10 MG/ML
40 INJECTION INTRAMUSCULAR; INTRAVENOUS DAILY
Status: DISCONTINUED | OUTPATIENT
Start: 2022-01-20 | End: 2022-01-20

## 2022-01-20 RX ORDER — DEXTROAMPHETAMINE SACCHARATE, AMPHETAMINE ASPARTATE MONOHYDRATE, DEXTROAMPHETAMINE SULFATE AND AMPHETAMINE SULFATE 3.75; 3.75; 3.75; 3.75 MG/1; MG/1; MG/1; MG/1
30 CAPSULE, EXTENDED RELEASE ORAL DAILY
Status: DISCONTINUED | OUTPATIENT
Start: 2022-01-20 | End: 2022-01-20

## 2022-01-20 RX ORDER — PANTOPRAZOLE SODIUM 40 MG/1
40 TABLET, DELAYED RELEASE ORAL
Status: DISCONTINUED | OUTPATIENT
Start: 2022-01-20 | End: 2022-01-22 | Stop reason: HOSPADM

## 2022-01-20 RX ADMIN — ENOXAPARIN SODIUM 40 MG: 40 INJECTION SUBCUTANEOUS at 20:32

## 2022-01-20 RX ADMIN — NICOTINE 1 PATCH: 14 PATCH, EXTENDED RELEASE TRANSDERMAL at 09:16

## 2022-01-20 RX ADMIN — ACETAMINOPHEN 650 MG: 325 TABLET, FILM COATED ORAL at 09:16

## 2022-01-20 RX ADMIN — ACETAMINOPHEN 650 MG: 325 TABLET, FILM COATED ORAL at 00:26

## 2022-01-20 RX ADMIN — FUROSEMIDE 40 MG: 10 INJECTION, SOLUTION INTRAMUSCULAR; INTRAVENOUS at 10:45

## 2022-01-20 RX ADMIN — FUROSEMIDE 40 MG: 10 INJECTION, SOLUTION INTRAMUSCULAR; INTRAVENOUS at 16:43

## 2022-01-20 RX ADMIN — ACETAMINOPHEN 650 MG: 325 TABLET, FILM COATED ORAL at 16:43

## 2022-01-20 RX ADMIN — VANCOMYCIN HYDROCHLORIDE 1500 MG: 500 INJECTION, POWDER, LYOPHILIZED, FOR SOLUTION INTRAVENOUS at 00:26

## 2022-01-20 RX ADMIN — ALBUTEROL SULFATE 2 PUFF: 90 AEROSOL, METERED RESPIRATORY (INHALATION) at 20:56

## 2022-01-20 RX ADMIN — ALLOPURINOL 100 MG: 100 TABLET ORAL at 13:26

## 2022-01-20 RX ADMIN — OXYCODONE HYDROCHLORIDE 5 MG: 5 TABLET ORAL at 20:33

## 2022-01-20 RX ADMIN — PANTOPRAZOLE SODIUM 40 MG: 40 TABLET, DELAYED RELEASE ORAL at 13:26

## 2022-01-20 RX ADMIN — LEVOFLOXACIN 500 MG: 500 INJECTION, SOLUTION INTRAVENOUS at 17:16

## 2022-01-20 ASSESSMENT — ACTIVITIES OF DAILY LIVING (ADL)
ADLS_ACUITY_SCORE: 5

## 2022-01-20 NOTE — PLAN OF CARE
AAOx4. Up independent in room. C/o of chest discomfort, declines intervention. On RA. IV Lasix ordered. Needs sputum collected, no sputum produced this shift. Tolerating diet. Continuing IV Levaquin. MRSA nasal swab sent. BC pending. Education given on increasing protein intake, nutrition consult placed as well. Wife updated on POC. Discharge pending.

## 2022-01-20 NOTE — PROGRESS NOTES
[facility]    Hospitalist Progress Note    Date of Service (when I saw the patient): 01/20/2022    Assessment & Plan   Greg Sage is a 36 year old male who was admitted on 1/19/2022.  Greg Sage is a 36 year old male with Pmhx significant for heavy alcohol use with resultant hepatic steatosis, tobacco use disorder, COLTON, ADHD, gout, who was admitted with dyspnea after presenting in acute respiratory distress on 1/19/2022.      Mr. Sage has been ill since around 12/23/2021. He was admitted to our hospital ICU from 1/1/2022-1/5/2022 for severe hypoxemic respiratory failure due to COVID-19 pneumonia after presenting with dyspnea and left-sided chest discomfort.  He was treated during hospitalization with dexamethasone, remdesivir, Rocephin and Zithromax for suspected secondary bacterial pneumonia.  He discharged on a course of oral Ceftin and doxycycline with home oxygen, as well as eliquis for DVT prophylaxis.      He was subsequently re-admitted on 1/16/2022-1/17/2022 treated for bilateral pneumonitis with Zosyn after presenting with continued upper abdominal/lower chest pain.  He discharged the next day on augmentin, remained on eliquis for DVT ppx. He was prescribed oxycodone at discharged for pain.      In the ED placed on 6 L/min via nasal cannula with an oxygen saturation recorded of 100% tachypneic with respiratory rate recorded 25-34.  Blood pressure 128/85, heart rate 131.  Lab work was notable for a D dimer elevated at 12.46. High sensitivity troponin and NT BNP within range. Lactic acid elevated at 2.5. VBG reassuring. BMP WNL. CBC with neutrophilic leukocytosis of 17.4k. Blood cultures were drawn. Imaging showed no evidence of PE. There were small bilateral pleural effusions increased from prior study on 1/16/2022 and increased patchy consolidations bilaterally. EKG shows sinus tachycardia at a ventricular rate of 157.      Discussed with Dr. Espinoza in the ED,  full chart review including lab work, imaging, and vital signs were reviewed. Patient received dilaudid, Zosyn, 1 L fluids in the ED. Admission was requested for further management.      Sepsis  Acute Respiratory Distress  secondary to multifactorial with Pneumonia and fluid overload with B/L pleural effusions and third spcing with hypoalbuminemia   Pneumonia   Chest pain secondary pleurisy from Pneumonia and Pleural effusions     Presented with tachypnea, cough and pleuritic chest discomfort. Maintaining oxygen saturations on 6 L/min oxy mask.  Lab work with leukocytosis, elevated D-dimer, lactic acidosis with increased bilateral consolidation, opacities in lower lungs and small pleural effusions. Concern is for untreated HAP in the setting of residual imaging findings secondary to prior viral pneumonia, pneumonitis. RF for HAP, would think treated against suspected pathogens. Question possible history of vaping associated lung injury. He was not treated previously with baricitinib during hospitalization for COVID given concern for superimposed pneumonia procalcitonin almost 20.   -Continue oxygen support (has been on intermittent chronic supplemental o2 since discharge in early January)  -Scheduled anti-inflammatories for pleuritic pain, anxiolytics  -obtain sputum cx  -BCx NGTD    CT scan on admission showed small bilateral pleural effusions have increased compared to 1/16/2022..  No evidence of PE or aortic dissection.  Patchy consolidation and groundglass opacities in both lower lungs have overall increased slightly and are again suspicious for pneumonia.  Hepatic steatosis seen.    Patient with a low albumin at 2.2 on admission patient received multiple courses of antibiotics since his hospitalization for COVID infection.  2 courses of antibiotics with ceftriaxone Zithromax and was given Zosyn during recent hospitalization and was discharged with Augmentin    So my concern at this time is more with fluid  "overload with his third spacing with a low albumin    Start him on Lasix 40 mg IV to help with diuresis 2 doses ordered total  Strict  I's and O's  Follow BMP closely with diuresis  Continue Levaquin for antibiotics  Stop vancomycin today. Check nasal MRSA swab   Patient was advised to increase protein intake to help with low  albumin  -   Tobacco Dependence  History of tobacco use, e cigarettes. Has not been utilizing for \"several weeks\" due to illness.   -NRT as needed     Heavy Alcohol Use   Hepatic Steatosis   No e/o current withdrawal or intoxication.Last drink over 72 hours ago.   -monitor. No CIWA in place  -Recommend etoh cessation and outpatient PCP follow up.      Awaiting formal pharmacy reconciliation to resume home medications.      DVT Prophylaxis: Enoxaparin (Lovenox) SQ  Code Status: Full Code  Expected Discharge:  In the next 2 to 3 days if respiratory status improves       Ruth Goodrich MD  [phone] (P)      Interval History   Patient complains of ongoing shortness of breath and cough.  He was tachypneic to respiratory rate in the 30s overnight.  Also complains of left-sided chest pain with breathing.     -Data reviewed today: I reviewed all new labs and imaging results over the last 24 hours. I personally reviewed CT chest results on admission       Temp: 98.7  F (37.1  C) Temp src: Oral BP: 135/81 Pulse: (!) 123   Resp: 20 SpO2: 95 % O2 Device: None (Room air) Oxygen Delivery: 2 LPM  Vitals:    01/19/22 1700   Weight: 98.4 kg (217 lb)     Vital Signs with Ranges  Temp:  [97.4  F (36.3  C)-98.7  F (37.1  C)] 98.7  F (37.1  C)  Pulse:  [] 123  Resp:  [13-32] 20  BP: (113-135)/(71-84) 135/81  SpO2:  [95 %-100 %] 95 %  I/O last 3 completed shifts:  In: 300 [P.O.:300]  Out: -     Constitutional: Awake, alert, cooperative, no apparent distress  Respiratory: Bilateral crackles with diminished breath sounds in the bases heard.  No wheezing  Cardiovascular: Regular rate and rhythm, normal S1 and " S2, and no murmur noted  GI: Normal bowel sounds, soft, non-distended, non-tender  Skin/Integumen: No rashes, no cyanosis, no edema  Other:     Medications       acetaminophen  650 mg Oral Q8H     enoxaparin ANTICOAGULANT  40 mg Subcutaneous Q24H     furosemide  40 mg Intravenous Q12H     levofloxacin  500 mg Intravenous Q24H     nicotine  1 patch Transdermal Daily     nicotine   Transdermal Q8H     pantoprazole  40 mg Oral QAM AC     sodium chloride (PF)  3 mL Intracatheter Q8H       Data   Recent Labs   Lab 01/20/22  0808 01/19/22  0943 01/17/22  0608 01/16/22  0443   WBC 8.8 17.4* 9.8 13.3*   HGB 12.5* 15.1 13.6 14.8   MCV 95 96 95 94    341 313 375    137 136 137   POTASSIUM 3.6 4.1 4.1 4.1   CHLORIDE 108 105 105 105   CO2 25 23 24 25   BUN 9 8 10 12   CR 0.60* 0.75 0.68 0.64*   ANIONGAP 5 9 7 7   LUDIN 8.5 8.8 8.5 8.8   GLC 98 139* 99 107*   ALBUMIN  --   --  2.2* 2.5*   PROTTOTAL  --   --  6.7* 7.1   BILITOTAL  --   --  1.6* 0.6   ALKPHOS  --   --  101 112   ALT  --   --  113* 159*   AST  --   --  34 49*   LIPASE  --   --   --  135       No results found for this or any previous visit (from the past 24 hour(s)).

## 2022-01-20 NOTE — PLAN OF CARE
Pertinent assessments: A&Ox4. Denies pain. Reported dyspnea on exertion. Initially on 6 L O2, and sats 100%. Oxygen titrated to 2 L. Patient desated to 88% without O2 on ambulation. LS dim on the R side and in the bases. Vitals stable. Voiding in the bathroom. Independent walking in the room.   Major Shift Events .  Treatment Plan: Monitor sats, resp status, IV ABX.   Bedside Nurse: Renetta Krishna RN

## 2022-01-21 ENCOUNTER — APPOINTMENT (OUTPATIENT)
Dept: CARDIOLOGY | Facility: CLINIC | Age: 37
End: 2022-01-21
Attending: INTERNAL MEDICINE
Payer: COMMERCIAL

## 2022-01-21 LAB
ALBUMIN SERPL-MCNC: 2.2 G/DL (ref 3.4–5)
ALP SERPL-CCNC: 107 U/L (ref 40–150)
ALT SERPL W P-5'-P-CCNC: 66 U/L (ref 0–70)
ANION GAP SERPL CALCULATED.3IONS-SCNC: 7 MMOL/L (ref 3–14)
AST SERPL W P-5'-P-CCNC: 15 U/L (ref 0–45)
BILIRUB SERPL-MCNC: 0.6 MG/DL (ref 0.2–1.3)
BUN SERPL-MCNC: 12 MG/DL (ref 7–30)
CALCIUM SERPL-MCNC: 9 MG/DL (ref 8.5–10.1)
CHLORIDE BLD-SCNC: 108 MMOL/L (ref 94–109)
CO2 SERPL-SCNC: 25 MMOL/L (ref 20–32)
CREAT SERPL-MCNC: 0.69 MG/DL (ref 0.66–1.25)
ERYTHROCYTE [DISTWIDTH] IN BLOOD BY AUTOMATED COUNT: 12.7 % (ref 10–15)
GFR SERPL CREATININE-BSD FRML MDRD: >90 ML/MIN/1.73M2
GLUCOSE BLD-MCNC: 90 MG/DL (ref 70–99)
HCT VFR BLD AUTO: 39.3 % (ref 40–53)
HGB BLD-MCNC: 12.8 G/DL (ref 13.3–17.7)
LVEF ECHO: NORMAL
MCH RBC QN AUTO: 30.9 PG (ref 26.5–33)
MCHC RBC AUTO-ENTMCNC: 32.6 G/DL (ref 31.5–36.5)
MCV RBC AUTO: 95 FL (ref 78–100)
PLATELET # BLD AUTO: 288 10E3/UL (ref 150–450)
POTASSIUM BLD-SCNC: 3.2 MMOL/L (ref 3.4–5.3)
POTASSIUM BLD-SCNC: 4.2 MMOL/L (ref 3.4–5.3)
PROT SERPL-MCNC: 7.5 G/DL (ref 6.8–8.8)
RBC # BLD AUTO: 4.14 10E6/UL (ref 4.4–5.9)
SODIUM SERPL-SCNC: 140 MMOL/L (ref 133–144)
WBC # BLD AUTO: 8.6 10E3/UL (ref 4–11)

## 2022-01-21 PROCEDURE — 99233 SBSQ HOSP IP/OBS HIGH 50: CPT | Performed by: INTERNAL MEDICINE

## 2022-01-21 PROCEDURE — 999N000208 ECHOCARDIOGRAM LIMITED

## 2022-01-21 PROCEDURE — 93321 DOPPLER ECHO F-UP/LMTD STD: CPT | Mod: 26 | Performed by: INTERNAL MEDICINE

## 2022-01-21 PROCEDURE — 120N000001 HC R&B MED SURG/OB

## 2022-01-21 PROCEDURE — 250N000013 HC RX MED GY IP 250 OP 250 PS 637: Performed by: INTERNAL MEDICINE

## 2022-01-21 PROCEDURE — 255N000002 HC RX 255 OP 636: Performed by: INTERNAL MEDICINE

## 2022-01-21 PROCEDURE — 85027 COMPLETE CBC AUTOMATED: CPT | Performed by: INTERNAL MEDICINE

## 2022-01-21 PROCEDURE — 250N000011 HC RX IP 250 OP 636: Performed by: PHYSICIAN ASSISTANT

## 2022-01-21 PROCEDURE — C8924 2D TTE W OR W/O FOL W/CON,FU: HCPCS

## 2022-01-21 PROCEDURE — 36415 COLL VENOUS BLD VENIPUNCTURE: CPT | Performed by: INTERNAL MEDICINE

## 2022-01-21 PROCEDURE — 93325 DOPPLER ECHO COLOR FLOW MAPG: CPT | Mod: 26 | Performed by: INTERNAL MEDICINE

## 2022-01-21 PROCEDURE — 80053 COMPREHEN METABOLIC PANEL: CPT | Performed by: INTERNAL MEDICINE

## 2022-01-21 PROCEDURE — 93308 TTE F-UP OR LMTD: CPT | Mod: 26 | Performed by: INTERNAL MEDICINE

## 2022-01-21 PROCEDURE — 250N000013 HC RX MED GY IP 250 OP 250 PS 637: Performed by: PHYSICIAN ASSISTANT

## 2022-01-21 PROCEDURE — 84132 ASSAY OF SERUM POTASSIUM: CPT | Performed by: INTERNAL MEDICINE

## 2022-01-21 RX ORDER — MULTIPLE VITAMINS W/ MINERALS TAB 9MG-400MCG
1 TAB ORAL DAILY
Status: DISCONTINUED | OUTPATIENT
Start: 2022-01-22 | End: 2022-01-22 | Stop reason: HOSPADM

## 2022-01-21 RX ORDER — POTASSIUM CHLORIDE 1500 MG/1
40 TABLET, EXTENDED RELEASE ORAL ONCE
Status: COMPLETED | OUTPATIENT
Start: 2022-01-21 | End: 2022-01-21

## 2022-01-21 RX ADMIN — HUMAN ALBUMIN MICROSPHERES AND PERFLUTREN 3 ML: 10; .22 INJECTION, SOLUTION INTRAVENOUS at 14:04

## 2022-01-21 RX ADMIN — POTASSIUM CHLORIDE 40 MEQ: 1500 TABLET, EXTENDED RELEASE ORAL at 08:43

## 2022-01-21 RX ADMIN — ALBUTEROL SULFATE 2 PUFF: 90 AEROSOL, METERED RESPIRATORY (INHALATION) at 20:32

## 2022-01-21 RX ADMIN — OXYCODONE HYDROCHLORIDE 5 MG: 5 TABLET ORAL at 20:37

## 2022-01-21 RX ADMIN — ALBUTEROL SULFATE 2 PUFF: 90 AEROSOL, METERED RESPIRATORY (INHALATION) at 08:45

## 2022-01-21 RX ADMIN — LEVOFLOXACIN 500 MG: 500 INJECTION, SOLUTION INTRAVENOUS at 16:41

## 2022-01-21 RX ADMIN — ALLOPURINOL 100 MG: 100 TABLET ORAL at 08:44

## 2022-01-21 RX ADMIN — ENOXAPARIN SODIUM 40 MG: 40 INJECTION SUBCUTANEOUS at 20:32

## 2022-01-21 RX ADMIN — ACETAMINOPHEN 650 MG: 325 TABLET, FILM COATED ORAL at 16:41

## 2022-01-21 RX ADMIN — PANTOPRAZOLE SODIUM 40 MG: 40 TABLET, DELAYED RELEASE ORAL at 06:22

## 2022-01-21 RX ADMIN — NICOTINE 1 PATCH: 14 PATCH, EXTENDED RELEASE TRANSDERMAL at 08:43

## 2022-01-21 RX ADMIN — ACETAMINOPHEN 650 MG: 325 TABLET, FILM COATED ORAL at 00:21

## 2022-01-21 RX ADMIN — ALBUTEROL SULFATE 2 PUFF: 90 AEROSOL, METERED RESPIRATORY (INHALATION) at 15:16

## 2022-01-21 RX ADMIN — ACETAMINOPHEN 650 MG: 325 TABLET, FILM COATED ORAL at 08:44

## 2022-01-21 ASSESSMENT — ACTIVITIES OF DAILY LIVING (ADL)
ADLS_ACUITY_SCORE: 5

## 2022-01-21 NOTE — PROGRESS NOTES
[facility], [place]    Hospitalist Progress Note    Date of Service (when I saw the patient): 01/21/2022    Assessment & Plan   Greg Sage is a 36 year old male who was admitted on 1/19/2022.  Greg Sage is a 36 year old male with Pmhx significant for heavy alcohol use with resultant hepatic steatosis, tobacco use disorder, COLTON, ADHD, gout, who was admitted with dyspnea after presenting in acute respiratory distress on 1/19/2022.      Mr. Sage has been ill since around 12/23/2021. He was admitted to our hospital ICU from 1/1/2022-1/5/2022 for severe hypoxemic respiratory failure due to COVID-19 pneumonia after presenting with dyspnea and left-sided chest discomfort.  He was treated during hospitalization with dexamethasone, remdesivir, Rocephin and Zithromax for suspected secondary bacterial pneumonia.  He discharged on a course of oral Ceftin and doxycycline with home oxygen, as well as eliquis for DVT prophylaxis.      He was subsequently re-admitted on 1/16/2022-1/17/2022 treated for bilateral pneumonitis with Zosyn after presenting with continued upper abdominal/lower chest pain.  He discharged the next day on augmentin, remained on eliquis for DVT ppx. He was prescribed oxycodone at discharged for pain.      In the ED placed on 6 L/min via nasal cannula with an oxygen saturation recorded of 100% tachypneic with respiratory rate recorded 25-34.  Blood pressure 128/85, heart rate 131.  Lab work was notable for a D dimer elevated at 12.46. High sensitivity troponin and NT BNP within range. Lactic acid elevated at 2.5. VBG reassuring. BMP WNL. CBC with neutrophilic leukocytosis of 17.4k. Blood cultures were drawn. Imaging showed no evidence of PE. There were small bilateral pleural effusions increased from prior study on 1/16/2022 and increased patchy consolidations bilaterally. EKG shows sinus tachycardia at a ventricular rate of 157.    Patient received dilaudid, Zosyn, 1 L fluids  "in the ED.   Sepsis  Acute Respiratory Distress  secondary to multifactorial with Pneumonia and fluid overload with B/L pleural effusions and third spcing with hypoalbuminemia   Pneumonia   Chest pain secondary pleurisy from Pneumonia and Pleural effusions     Presented with tachypnea, cough and pleuritic chest discomfort. Maintaining oxygen saturations on 6 L/min oxy mask.  Lab work with leukocytosis, elevated D-dimer, lactic acidosis with increased bilateral consolidation, opacities in lower lungs and small pleural effusions. Concern is for untreated HAP in the setting of residual imaging findings secondary to prior viral pneumonia, pneumonitis. RF for HAP, would think treated against suspected pathogens. Question possible history of vaping associated lung injury. He was not treated previously with baricitinib during hospitalization for COVID given concern for superimposed pneumonia procalcitonin almost 20.CT scan on admission showed small bilateral pleural effusions have increased compared to 1/16/2022..  No evidence of PE or aortic dissection.  Patchy consolidation and groundglass opacities in both lower lungs have overall increased slightly and are again suspicious for pneumonia.  Hepatic steatosis seen.   -Continue oxygen support (has been on intermittent chronic supplemental o2 since discharge in early January), this admission patient has been started on levofloxacin 5 mg daily.  He did receive a dose of vancomycin as well.  -Scheduled anti-inflammatories for pleuritic pain, anxiolytics  -Sputum cultures pending, blood cultures negative to date.  Possible pericarditis  -Patient was given 40 mg Lasix IV for diuresis, received 2 doses.  -Continue strict intake output charting, latest echo done on admission on second shows good EF.  Current concerns include possibility of pericarditis, will repeat echocardiogram    Tobacco Dependence  History of tobacco use, e cigarettes. Has not been utilizing for \"several " "weeks\" due to illness.   -NRT as needed, patient vapes but have not done since his diagnosis of COVID.     Heavy Alcohol Use   Hepatic Steatosis   No e/o current withdrawal or intoxication.Last drink over 72 hours ago.   -monitor. No CIWA in place  -Recommend etoh cessation and outpatient PCP follow up.      Awaiting formal pharmacy reconciliation to resume home medications.      DVT Prophylaxis: Enoxaparin (Lovenox) SQ  Code Status: Full Code  Expected Discharge:  Discharge in a.m. depending on echocardiogram results       Violette Avery MD        Interval History   Shortness of breath is improved, mentions a left-sided chest pain, which is worse with a deep breathing, appears to be a symptom of pericarditis.  Cussed about getting echo and monitoring today.    -Data reviewed today: I reviewed all new labs and imaging results over the last 24 hours. I personally reviewed CT chest results on admission       Temp: 98.7  F (37.1  C) Temp src: Oral BP: 119/72 Pulse: 96   Resp: 16 SpO2: 96 % O2 Device: None (Room air)    Vitals:    01/19/22 1700   Weight: 98.4 kg (217 lb)     Vital Signs with Ranges  Temp:  [97.4  F (36.3  C)-98.7  F (37.1  C)] 98.7  F (37.1  C)  Pulse:  [] 123  Resp:  [13-32] 20  BP: (113-135)/(71-84) 135/81  SpO2:  [95 %-100 %] 95 %  I/O last 3 completed shifts:  In: 200 [P.O.:200]  Out: 800 [Urine:800]    Constitutional: Awake, alert, cooperative, no apparent distress  Respiratory: Bilateral crackles with diminished breath sounds in the bases heard.  No wheezing  Cardiovascular: Regular rate and rhythm, normal S1 and S2, and no murmur noted  GI: Normal bowel sounds, soft, non-distended, non-tender  Skin/Integumen: No rashes, no cyanosis, no edema  Other:     Medications       acetaminophen  650 mg Oral Q8H     albuterol  2 puff Inhalation Q6H     allopurinol  100 mg Oral Daily     amphetamine-dextroamphetamine  30 mg Oral Daily     enoxaparin ANTICOAGULANT  40 mg Subcutaneous Q24H     " levofloxacin  500 mg Intravenous Q24H     [START ON 1/22/2022] multivitamin w/minerals  1 tablet Oral Daily     nicotine  1 patch Transdermal Daily     nicotine   Transdermal Q8H     pantoprazole  40 mg Oral QAM AC     sodium chloride (PF)  3 mL Intracatheter Q8H       Data   Recent Labs   Lab 01/21/22  0606 01/20/22  1333 01/20/22  0808 01/19/22  0943 01/17/22  0608 01/16/22  0443   WBC 8.6  --  8.8 17.4*   < > 13.3*   HGB 12.8*  --  12.5* 15.1   < > 14.8   MCV 95  --  95 96   < > 94     --  270 341   < > 375     --  138 137   < > 137   POTASSIUM 3.2* 3.5 3.6 4.1   < > 4.1   CHLORIDE 108  --  108 105   < > 105   CO2 25  --  25 23   < > 25   BUN 12  --  9 8   < > 12   CR 0.69  --  0.60* 0.75   < > 0.64*   ANIONGAP 7  --  5 9   < > 7   LUDIN 9.0  --  8.5 8.8   < > 8.8   GLC 90  --  98 139*   < > 107*   ALBUMIN 2.2* 2.0*  --   --    < > 2.5*   PROTTOTAL 7.5 6.9  --   --    < > 7.1   BILITOTAL 0.6 0.9  --   --    < > 0.6   ALKPHOS 107 107  --   --    < > 112   ALT 66 74*  --   --    < > 159*   AST 15 22  --   --    < > 49*   LIPASE  --   --   --   --   --  135    < > = values in this interval not displayed.       No results found for this or any previous visit (from the past 24 hour(s)).

## 2022-01-21 NOTE — CONSULTS
"CLINICAL NUTRITION SERVICES  -  ASSESSMENT NOTE      MALNUTRITION:  % Weight Loss:  None noted  % Intake:  No decreased intake noted  Subcutaneous Fat Loss:  None observed  Muscle Loss:  None observed  Fluid Retention: None documented    Malnutrition Diagnosis: Patient does not meet two of the above criteria necessary for diagnosing malnutrition        REASON FOR ASSESSMENT  Greg Sage is a 36 year old male seen by Registered Dietitian for RN Consult - \"MD wants patient to increase protein intake.  Education needed on how to increase protein intake\".      PMH of: Heavy etoh use, hepatic steatosis, gout.    Admit 2/2: PNA, fluid overload, PE, acute respiratory distress.      NUTRITION HISTORY  - Information obtained from patient and chart.  - Diet at home: Regular reported. Does not verbalize etoh intake with this writer.   - Usual intakes: Variable meals daily reported, at least 1 meal/day.  No set meal patterns.  Notes his wife does most meal prep/cooking.  He is able to verbalize some protein containing foods such as meat.    - Barriers to PO intakes: Denies decreased PO intakes PTA including skipping of meals or smaller portions.    - Use of oral supplements: None, has heard of Ensure/Boost.  - Chewing/swallowing issues: Denied.  - Allergies: NKFA.      CURRENT NUTRITION ORDERS  Diet Order:     Regular  Ensure BID between meals ordered by MD    Current Intake/Tolerance:  No flowsheet recordings, limited timeframe since admit.  Eating breakfast while in room.  Reports he's had a good appetite and feels he's eating at his baseline.  Denies nausea.        NUTRITION FOCUSED PHYSICAL ASSESSMENT FOR DIAGNOSING MALNUTRITION)  Yes    Obtained from Chart/Interdisciplinary Team:  - No documentation of PI  - Stooling patterns reviewed    ANTHROPOMETRICS  Height: 6' 1\"  Weight: 217 lbs 0 oz  Body mass index is 28.63 kg/m .  Weight Status:  Overweight BMI 25-29.9  Weight History:  Wt Readings from Last 10 Encounters: "   01/19/22 98.4 kg (217 lb)   01/16/22 96.1 kg (211 lb 14.4 oz)   01/04/22 97.1 kg (214 lb 1.6 oz)     - Wt of 219# from 6/24/2021.  Denies wt loss PTA and reports UBW is somewhere around 215#.    LABS  Labs reviewed:  Electrolytes  Potassium (mmol/L)   Date Value   01/21/2022 3.2 (L)   01/20/2022 3.5   01/20/2022 3.6     Phosphorus (mg/dL)   Date Value   01/05/2022 3.2   01/04/2022 3.5   01/03/2022 1.8 (L)    Blood Glucose  Glucose (mg/dL)   Date Value   01/21/2022 90   01/20/2022 98   01/19/2022 139 (H)   01/17/2022 99   01/16/2022 107 (H)    Inflammatory Markers  CRP Inflammation (mg/L)   Date Value   01/19/2022 136.0 (H)   01/16/2022 109.0 (H)   01/05/2022 110.0 (H)     WBC Count (10e3/uL)   Date Value   01/21/2022 8.6   01/20/2022 8.8   01/19/2022 17.4 (H)     Albumin (g/dL)   Date Value   01/21/2022 2.2 (L)   01/20/2022 2.0 (L)   01/17/2022 2.2 (L)      Magnesium (mg/dL)   Date Value   01/03/2022 2.8 (H)   01/01/2022 2.2     Sodium (mmol/L)   Date Value   01/21/2022 140   01/20/2022 138   01/19/2022 137    Renal  Urea Nitrogen (mg/dL)   Date Value   01/21/2022 12   01/20/2022 9   01/19/2022 8     Creatinine (mg/dL)   Date Value   01/21/2022 0.69   01/20/2022 0.60 (L)   01/19/2022 0.75     Additional  No results found for: TRIG, URINEKETONE     B/P: 118/79, T: 98.5, P: 99, R: 16      MEDICATIONS  Medications reviewed:    acetaminophen  650 mg Oral Q8H     albuterol  2 puff Inhalation Q6H     allopurinol  100 mg Oral Daily     amphetamine-dextroamphetamine  30 mg Oral Daily     enoxaparin ANTICOAGULANT  40 mg Subcutaneous Q24H     levofloxacin  500 mg Intravenous Q24H     nicotine  1 patch Transdermal Daily     nicotine   Transdermal Q8H     pantoprazole  40 mg Oral QAM AC     potassium chloride  40 mEq Oral Once     sodium chloride (PF)  3 mL Intracatheter Q8H             ASSESSED NUTRITION NEEDS PER APPROVED PRACTICE GUIDELINES:    Dosing Weight 98 kg   Estimated Energy Needs: 20-25 Kcal/Kg  Justification:  maintenance  Estimated Protein Needs: 1-1.2 g pro/Kg  Justification: preservation of lean body mass  Estimated Fluid Needs: per MD      NUTRITION DIAGNOSIS:  Predicted inadequate nutrient intake (energy/protein) related to potential for decline in PO intakes during admit pending clinical progression and LOS.    NUTRITION INTERVENTIONS  Recommendations / Nutrition Prescription  Continue diet as ordered.  Encouraged consistent protein intake daily and reviewed food sources.    Changed supplement to prn.  He had not yet received the supplement (as was scheduled at 10 and 2), sent a 1 time trial and he would like to consider protein from food first.  We did discuss other options for supplements in the home setting such as protein bars or [employer], if these would be a better fit long-term.  Also briefly discussed that albumin is not necessarily a true reflection of overall nutritional status as highly influenced by acute illness/inflammation and fluid, but for people in his position, consistent protein intake daily is still recommended.      Add daily MVI/M (etoh hx).      Implementation  Nutrition education: Provided education on above.    Medical Food Supplement: As above.     Multivitamin/Mineral: As above.     Collaboration and Referral of Nutrition care: Discussed POC with team during rounds.      Nutrition Goals  Patient to consume at least 75% of meals BID-TID while admitted.      MONITORING AND EVALUATION:  Progress towards goals will be monitored and evaluated per protocol and Practice Guidelines          Lacy Mendoza RDN, LD  Clinical Dietitian  3rd floor/ICU: [phone]  All other floors: [phone]  Weekend/holiday: [phone]  Office: [phone]

## 2022-01-21 NOTE — PLAN OF CARE
End of Shift Summary  For vital signs and complete assessments, please see documentation flowsheets.     Pertinent assessments: A & O x4. VSS this shift. Had some pain in chest, given tylenol with relief.   On room air and sating at WNL. Independent and ambulating to the bathroom.     Major Shift Events  None    Treatment Plan: IV ABX, IV lasix, monitor respiration and encourage IS.  Bedside Nurse: Katerina Cardenas RN

## 2022-01-21 NOTE — PLAN OF CARE
Pertinent assessments: A & O x4. VSS this shift. Had some pain  and was managed with tylenol and oxycodone with relief. On room air and sating at 95%. Independent and ambulating to the bathroom. Had IV ABX for pneumonia.    Major Shift Events  Ambulating independently.    Treatment Plan: IV ABX, IV lasix, monitor respiration and encourage IS.

## 2022-01-22 VITALS
HEIGHT: 73 IN | RESPIRATION RATE: 20 BRPM | HEART RATE: 94 BPM | WEIGHT: 204.9 LBS | BODY MASS INDEX: 27.16 KG/M2 | DIASTOLIC BLOOD PRESSURE: 72 MMHG | TEMPERATURE: 97.8 F | SYSTOLIC BLOOD PRESSURE: 116 MMHG | OXYGEN SATURATION: 96 %

## 2022-01-22 LAB
CREAT SERPL-MCNC: 0.71 MG/DL (ref 0.66–1.25)
GFR SERPL CREATININE-BSD FRML MDRD: >90 ML/MIN/1.73M2
PLATELET # BLD AUTO: 316 10E3/UL (ref 150–450)
POTASSIUM BLD-SCNC: 4.2 MMOL/L (ref 3.4–5.3)

## 2022-01-22 PROCEDURE — 85049 AUTOMATED PLATELET COUNT: CPT | Performed by: PHYSICIAN ASSISTANT

## 2022-01-22 PROCEDURE — 36415 COLL VENOUS BLD VENIPUNCTURE: CPT | Performed by: PHYSICIAN ASSISTANT

## 2022-01-22 PROCEDURE — 250N000013 HC RX MED GY IP 250 OP 250 PS 637: Performed by: INTERNAL MEDICINE

## 2022-01-22 PROCEDURE — 82565 ASSAY OF CREATININE: CPT | Performed by: PHYSICIAN ASSISTANT

## 2022-01-22 PROCEDURE — 84132 ASSAY OF SERUM POTASSIUM: CPT | Performed by: INTERNAL MEDICINE

## 2022-01-22 PROCEDURE — 250N000013 HC RX MED GY IP 250 OP 250 PS 637: Performed by: PHYSICIAN ASSISTANT

## 2022-01-22 PROCEDURE — 99239 HOSP IP/OBS DSCHRG MGMT >30: CPT | Performed by: INTERNAL MEDICINE

## 2022-01-22 RX ORDER — LEVOFLOXACIN 500 MG/1
500 TABLET, FILM COATED ORAL DAILY
Qty: 4 TABLET | Refills: 0 | Status: SHIPPED | OUTPATIENT
Start: 2022-01-22 | End: 2022-01-26

## 2022-01-22 RX ADMIN — MULTIPLE VITAMINS W/ MINERALS TAB 1 TABLET: TAB at 08:07

## 2022-01-22 RX ADMIN — ALBUTEROL SULFATE 2 PUFF: 90 AEROSOL, METERED RESPIRATORY (INHALATION) at 08:09

## 2022-01-22 RX ADMIN — OXYCODONE HYDROCHLORIDE 5 MG: 5 TABLET ORAL at 00:40

## 2022-01-22 RX ADMIN — PANTOPRAZOLE SODIUM 40 MG: 40 TABLET, DELAYED RELEASE ORAL at 06:37

## 2022-01-22 RX ADMIN — NICOTINE 1 PATCH: 14 PATCH, EXTENDED RELEASE TRANSDERMAL at 08:10

## 2022-01-22 RX ADMIN — ALLOPURINOL 100 MG: 100 TABLET ORAL at 08:08

## 2022-01-22 RX ADMIN — ACETAMINOPHEN 650 MG: 325 TABLET, FILM COATED ORAL at 00:20

## 2022-01-22 RX ADMIN — ACETAMINOPHEN 650 MG: 325 TABLET, FILM COATED ORAL at 08:07

## 2022-01-22 ASSESSMENT — ACTIVITIES OF DAILY LIVING (ADL)
ADLS_ACUITY_SCORE: 5

## 2022-01-22 ASSESSMENT — MIFFLIN-ST. JEOR: SCORE: 1908.3

## 2022-01-22 NOTE — PLAN OF CARE
To Do:  End of Shift Summary  For vital signs and complete assessments, please see documentation flowsheets.     Pertinent assessments: Patient is Aox4  this shift. Complains of left  sided chest pain, scheduled medications given with relief.  Endorses SOB with activity, clear but diminished lung sounds in all fields. Active bowel sounds.     Major Shift Events: Potassium replacement, recheck potassium 4.2. Echo done this shift     Treatment Plan: Pain management, supplemental oxygen as needed. Waiting for ECHO results     Bedside Nurse: Eleonora Paz RN

## 2022-01-22 NOTE — DISCHARGE SUMMARY
Discharge Summary  Hospitalist Service    Greg Sage MRN# [id number]   YOB: 1985 Age: 36 year old     Date of Admission:  1/19/2022  Date of Discharge:  1/22/2022  Admitting Physician:  Ector Solis MD  Discharge Physician: Mitzi King MD  Discharging Service: Hospitalist Service     Primary Provider: Candy Julian  Primary Care Physician Phone Number: None         Discharge Diagnoses/Problem Oriented Hospital Course (Providers):    Greg Sage was admitted on 1/19/2022 by Ector Solis MD and I would refer you to their history and physical.  The following problems were addressed during his hospitalization:      Greg Sage is a 36 year old male who was admitted on 1/19/2022.  Greg Sage is a 36 year old male with Pmhx significant for heavy alcohol use with resultant hepatic steatosis, tobacco use disorder, COLTON, ADHD, gout, who was admitted with dyspnea after presenting in acute respiratory distress on 1/19/2022.      Mr. Sage has been ill since around 12/23/2021. He was admitted to our hospital ICU from 1/1/2022-1/5/2022 for severe hypoxemic respiratory failure due to COVID-19 pneumonia after presenting with dyspnea and left-sided chest discomfort.  He was treated during hospitalization with dexamethasone, remdesivir, Rocephin and Zithromax for suspected secondary bacterial pneumonia.  He discharged on a course of oral Ceftin and doxycycline with home oxygen, as well as eliquis for DVT prophylaxis.      He was subsequently re-admitted on 1/16/2022-1/17/2022 treated for bilateral pneumonitis with Zosyn after presenting with continued upper abdominal/lower chest pain.  He discharged the next day on augmentin, remained on eliquis for DVT ppx. He was prescribed oxycodone at discharged for pain.      He presented again with respiratory symptoms, primarily tachypnea  Lab work was notable for a D dimer elevated at 12.46. High sensitivity troponin and NT BNP  within range. Lactic acid elevated at 2.5. VBG reassuring. BMP WNL. CBC with neutrophilic leukocytosis of 17.4k.     Blood cultures were drawn. Imaging showed no evidence of PE. There were small bilateral pleural effusions increased from prior study on 1/16/2022 and increased patchy consolidations bilaterally.    He was admitted, diuresed,   Sepsis  Acute Respiratory Distress  secondary to multifactorial with Pneumonia and fluid overload with B/L pleural effusions and third spcing with hypoalbuminemia   Pneumonia   Chest pain secondary pleurisy from Pneumonia and Pleural effusions     Presented with tachypnea, cough and pleuritic chest discomfort. Maintaining oxygen saturations on 6 L/min oxy mask.  Lab work with leukocytosis, elevated D-dimer, lactic acidosis with increased bilateral consolidation, opacities in lower lungs and small pleural effusions. Concern is for untreated HAP in the setting of residual imaging findings secondary to prior viral pneumonia, pneumonitis. RF for HAP, would think treated against suspected pathogens. Question possible history of vaping associated lung injury. He was not treated previously with baricitinib during hospitalization for COVID given concern for superimposed pneumonia procalcitonin almost 20.CT scan on admission showed small bilateral pleural effusions have increased compared to 1/16/2022..  No evidence of PE or aortic dissection.  Patchy consolidation and groundglass opacities in both lower lungs have overall increased slightly and are again suspicious for pneumonia.  Hepatic steatosis seen.   I wonder if this is just the vagaries associated with COVID and suspected pleurisy with shallow breathing, certainly his ongoing Vaping does help  -Levofloxacin for suspected HAP-will complete a 7 day course  -Scheduled anti-inflammatories for pleuritic pain, prn oxy as before  -was on enox for  dvt ppx will resume prior  apixaban at discharge    Possible pericarditis  -Patient was  "given 40 mg Lasix IV for diuresis, received 2 doses  -repeat echo looks good with EF 60-65 %     Tobacco Dependence  History of tobacco use, e cigarettes. Has not been utilizing for \"several weeks\" due to illness.   -NRT as needed, patient vapes and has done so up to this  Hospitalization. Recommended NO MORE VAPING or his lungs may never heal     Heavy Alcohol Use   Hepatic Steatosis   No e/o current withdrawal or intoxication.Last drink over 72 hours ago.   -monitor. No CIWA in place  -Recommend etoh cessation and outpatient PCP follow up.           DVT Prophylaxis: Enoxaparin (Lovenox) SQ  Code Status: Full Code    I updated the wife re hospital course and discharge         Code Status:      Full Code        Brief Hospital Stay Summary Sent Home With Patient in AVS:        Reason for your hospital stay      This was your third hospitalization related to lung infections and/or   sequelae from COVID   You have  improved with another round of antibiotics                      Important Results:      As noted below         Pending Results:        Unresulted Labs Ordered in the Past 30 Days of this Admission     Date and Time Order Name Status Description    1/19/2022  9:56 AM Blood Culture Arm, Right Preliminary     1/19/2022  9:56 AM Blood Culture Arm, Left Preliminary             Discharge Instructions and Follow-Up:      Follow-up Appointments     Follow-up and recommended labs and tests       F/up with your PCP as  scheduled on 1/24/22  You should  continue  to use the incentive spirometer 4-6 times per day, 3   breaths each  You should NOT VAPE for at least a month, preferably you should quit   forever-your lungs need a chance to heal  Avoid being out in the cold for long periods of time               Discharge Disposition:      Discharged to home         Discharge Medications:        Current Discharge Medication List      START taking these medications    Details   levofloxacin (LEVAQUIN) 500 MG tablet Take 1 " tablet (500 mg) by mouth daily for 4 days  Qty: 4 tablet, Refills: 0    Associated Diagnoses: Sepsis without acute organ dysfunction, due to unspecified organism (H)         CONTINUE these medications which have NOT CHANGED    Details   albuterol (PROAIR HFA/PROVENTIL HFA/VENTOLIN HFA) 108 (90 Base) MCG/ACT inhaler Inhale 2 puffs into the lungs every 6 hours  Qty: 18 g, Refills: 0    Comments: Pharmacy may dispense brand covered by insurance (Proair, or proventil or ventolin or generic albuterol inhaler)  Associated Diagnoses: Pneumonia due to 2019 novel coronavirus      allopurinol (ZYLOPRIM) 100 MG tablet Take 100 mg by mouth daily      amphetamine-dextroamphetamine (ADDERALL XR) 30 MG 24 hr capsule Take 30 mg by mouth daily       apixaban ANTICOAGULANT (ELIQUIS ANTICOAGULANT) 2.5 MG tablet Take 1 tablet (2.5 mg) by mouth 2 times daily for 27 days  Qty: 54 tablet, Refills: 0    Associated Diagnoses: Pneumonia due to 2019 novel coronavirus      Multiple Vitamins-Minerals (ONE-A-DAY MENS HEALTH FORMULA PO) Take 1 tablet by mouth daily      nicotine (NICODERM CQ) 14 MG/24HR 24 hr patch Place 1 patch onto the skin every 24 hours  Qty: 30 patch, Refills: 0    Associated Diagnoses: Pneumonia due to 2019 novel coronavirus      oxyCODONE (ROXICODONE) 5 MG tablet Take 1 tablet (5 mg) by mouth every 4 hours as needed for moderate to severe pain  Qty: 20 tablet, Refills: 0    Associated Diagnoses: Pleural effusion on right; Pneumonia due to 2019 novel coronavirus         STOP taking these medications       amoxicillin-clavulanate (AUGMENTIN) 875-125 MG tablet Comments:   Reason for Stopping:                 Allergies:         Allergies   Allergen Reactions     Bactrim [Sulfamethoxazole W/Trimethoprim]            Consultations This Hospital Stay:      No consultations were requested during this admission         Condition and Physical on Discharge:      Discharge condition: Stable   Vitals: Blood pressure 116/72, pulse 94,  "temperature 97.8  F (36.6  C), temperature source Oral, resp. rate 20, height 1.854 m (6' 1\"), weight 92.9 kg (204 lb 14.4 oz), SpO2 96 %.     Constitutional: Pleasant nad looks stated age head nc/at sclera clearr   Lungs: Decreased in LLL particularly but all over diminished  No  Crackles or wheeze   Cardiovascular: rrr no mrg no edema   Abdomen: S/nt/nd   Skin: Warm and dry no cyanosis or clubbing    Other: Alert and oriented affect appropriate thompson          Discharge Time:      Greater than 30 minutes.        Image Results From This Hospital Stay (For Non-EPIC Providers):        Results for orders placed or performed during the hospital encounter of 01/19/22   XR Chest Port 1 View    Narrative    CHEST ONE VIEW PORTABLE   1/19/2022 9:56 AM     HISTORY:  Chest pain. Shortness of breath.    COMPARISON: 1/1/2022.      Impression    IMPRESSION: Somewhat shallow inspiration. Small right pleural effusion  is new since the previous exam. Patchy infiltrates in both lower lungs  have improved. No pneumothorax. Pulmonary vascularity is within normal  limits.    ALVARADO WASHINGTON MD         SYSTEM ID:  [id number]   CT Chest (PE) Abdomen Pelvis w Contrast    Narrative    CT CHEST PE ABDOMEN AND PELVIS WITH CONTRAST 1/19/2022 10:11 AM    CLINICAL HISTORY: Left-sided chest pain. Abdominal pain. Respiratory  distress.  TECHNIQUE: CT angiogram chest and routine CT abdomen pelvis with IV  contrast. Arterial phase through the chest and venous phase through  the abdomen and pelvis. 2D and 3D MIP reconstructions were preformed  by the CT technologist. Dose reduction techniques were used.   CONTRAST: 100mL Isovue-370  COMPARISON: CT of the chest, abdomen, and pelvis performed 1/16/2022.    FINDINGS:  ANGIOGRAM CHEST: Pulmonary arteries are normal caliber and negative  for pulmonary emboli. Thoracic aorta is negative for dissection. No CT  evidence of right heart strain.    LUNGS AND PLEURA: Small bilateral pleural effusions, larger on " the  right, increased compared to 2022. Patchy consolidation and  groundglass opacities in both lower lungs have overall increased  slightly, and are suspicious for pneumonia. No pneumothorax.    MEDIASTINUM/AXILLAE: Mild mediastinal adenopathy is unchanged, and may  be reactive. No pericardial effusion.    CORONARY ARTERY CALCIFICATION: None.    HEPATOBILIARY: Diffuse fatty infiltration of the liver. No hepatic  masses. Unremarkable gallbladder.    PANCREAS: Normal.    SPLEEN: Normal.    ADRENAL GLANDS: Normal.    KIDNEYS/BLADDER: Unremarkable. No hydronephrosis.    BOWEL: No bowel obstruction. No convincing evidence for colitis or  diverticulitis. Unremarkable appendix.    PELVIC ORGANS: Unremarkable.    LYMPH NODES: No enlarged lymph nodes are identified in the abdomen or  pelvis.    VASCULATURE: Unremarkable.    ADDITIONAL FINDINGS: None.    MUSCULOSKELETAL: Unremarkable.      Impression    IMPRESSION:  1.  No evidence for pulmonary embolism or aortic dissection.  2.  Small bilateral pleural effusions have increased compared to  2022.  3.  Patchy consolidation and groundglass opacities in both lower lungs  have overall increased slightly, and are again suspicious for  pneumonia.  4.  Hepatic steatosis.    ALVARADO WASHINGTON MD         SYSTEM ID:  [id number]   Echocardiogram Limited     Value    LVEF  60-65%    Narrative    [id number]  [id number]  [id number]  [id number]^MCKINLEY^GISSELLE     [facility]  Echocardiography Laboratory  [address] [place] [address]     Name: JAEL LAWSON  MRN: [id number]  : 1985  Study Date: 2022 01:31 PM  Age: 36 yrs  Gender: Male  Patient Location: [facility]  Reason For Study: Acute Pericarditis  Ordering Physician: GISSELLE SEN  Performed By: Candy Baltazar     BSA: 2.2 m2  Height: 73 in  Weight: 217 lb  HR: 105  BP: 119/72 mmHg  ______________________________________________________________________________  Procedure  Limited Portable  Echo Adult. Optison (NDC #[id number]) given intravenously.  ______________________________________________________________________________  Interpretation Summary     Left ventricular systolic function is normal.The visual ejection fraction is  60-65%.  The right ventricular systolic function is normal.  There is mild (1+) tricuspid regurgitation.  The inferior vena cava was normal in size with preserved respiratory  variability.     Compared to echo dated 01/02/2022 no significant changes.  ______________________________________________________________________________  Left Ventricle  Left ventricular systolic function is normal. The visual ejection fraction is  60-65%. Diastolic Doppler findings (E/E' ratio and/or other parameters)  suggest left ventricular filling pressures are indeterminate. No regional wall  motion abnormalities noted.     Right Ventricle  The right ventricle is normal size. The right ventricular systolic function is  normal.     Atria  Normal left atrial size. Right atrial size is normal. There is no color  Doppler evidence of an atrial shunt.     Mitral Valve  There is trace mitral regurgitation.     Tricuspid Valve  There is mild (1+) tricuspid regurgitation. Right ventricular systolic  pressure could not be approximated due to inadequate tricuspid regurgitation.     Aortic Valve  The aortic valve is trileaflet. No aortic regurgitation is present. No aortic  stenosis is present.     Pulmonic Valve  There is trace pulmonic valvular regurgitation. There is no pulmonic valvular  stenosis.     Vessels  The inferior vena cava was normal in size with preserved respiratory  variability.     Pericardium  There is no pericardial effusion.     Rhythm  The rhythm was sinus tachycardia.  ______________________________________________________________________________  Doppler Measurements & Calculations  MV E max aleida: 57.9 cm/sec  MV A max aleida: 57.2 cm/sec  MV E/A: 1.0  MV dec time: 0.28 sec  LV V1 max PG:  4.3 mmHg  LV V1 max: 104.0 cm/sec  LV V1 VTI: 19.9 cm  E/E' av.0  Lateral E/e': 3.7  Medial E/e': 6.3     ______________________________________________________________________________  Report approved by: Ronak Salas 2022 02:22 PM                 Most Recent Lab Results In EPIC (For Non-EPIC Providers):    Most Recent 3 CBC's:  Recent Labs   Lab Test 22  0736 22  0606 22  0808 22  0943   WBC  --  8.6 8.8 17.4*   HGB  --  12.8* 12.5* 15.1   MCV  --  95 95 96    288 270 341      Most Recent 3 BMP's:  Recent Labs   Lab Test 22  0736 22  1513 22  0606 22  1333 22  0808 22  0943   NA  --   --  140  --  138 137   POTASSIUM 4.2 4.2 3.2*   < > 3.6 4.1   CHLORIDE  --   --  108  --  108 105   CO2  --   --  25  --  25 23   BUN  --   --  12  --  9 8   CR 0.71  --  0.69  --  0.60* 0.75   ANIONGAP  --   --  7  --  5 9   LUDIN  --   --  9.0  --  8.5 8.8   GLC  --   --  90  --  98 139*    < > = values in this interval not displayed.     Most Recent 3 Troponin's:No lab results found.  Most Recent 3 INR's:No lab results found.  Most Recent 2 LFT's:  Recent Labs   Lab Test 22  0606 22  1333   AST 15 22   ALT 66 74*   ALKPHOS 107 107   BILITOTAL 0.6 0.9     Most Recent Cholesterol Panel:No lab results found.  Most Recent 6 Bacteria Isolates From Any Culture (See EPIC Reports for Culture Details):No lab results found.  Most Recent TSH, T4 and HgbA1c: No lab results found.

## 2022-01-22 NOTE — PLAN OF CARE
End of Shift Summary  For vital signs and complete assessments, please see documentation flowsheets.     Pertinent assessments: Patient is Aox4  this shift. Complains of left sided chest pain, scheduled medications given with relief.  Endorses SOB with activity, clear lung sounds in all fields. Active bowel sounds.     Major Shift Events: Uneventful    Treatment Plan: Pain management, supplemental oxygen as needed. Waiting for ECHO results.  Possible discharge today.    Bedside Nurse: Katerina Cardenas RN

## 2022-01-24 ENCOUNTER — PATIENT OUTREACH (OUTPATIENT)
Dept: CARE COORDINATION | Facility: CLINIC | Age: 37
End: 2022-01-24
Payer: COMMERCIAL

## 2022-01-24 DIAGNOSIS — Z71.89 OTHER SPECIFIED COUNSELING: ICD-10-CM

## 2022-01-24 LAB
BACTERIA BLD CULT: NO GROWTH
BACTERIA BLD CULT: NO GROWTH

## 2022-01-24 NOTE — PROGRESS NOTES
Clinic Care Coordination Contact  [facility]/Voicemail       Clinical Data: Care Coordinator Outreach  Outreach attempted x 1.  Left message on patient's voicemail with call back information and requested return call.  Plan: Care Coordinator will try to reach patient again in 1-2 business days.    Jay Cortez  Community Health Worker  [facility]  Ph:[phone]

## 2022-01-25 NOTE — PROGRESS NOTES
Clinic Care Coordination Contact  [facility]/Voicemail       Clinical Data: Care Coordinator Outreach  Outreach attempted x 2.  Left message on patient's voicemail with call back information and requested return call.  Plan: Care Coordinator will do no further outreaches at this time.    Jay Cortez  Community Health Worker  [facility]  Ph:[phone]